# Patient Record
Sex: FEMALE | Race: WHITE | Employment: OTHER | URBAN - METROPOLITAN AREA
[De-identification: names, ages, dates, MRNs, and addresses within clinical notes are randomized per-mention and may not be internally consistent; named-entity substitution may affect disease eponyms.]

---

## 2017-04-18 ENCOUNTER — HOSPITAL ENCOUNTER (OUTPATIENT)
Dept: MAMMOGRAPHY | Age: 82
Discharge: HOME | End: 2017-04-18
Attending: INTERNAL MEDICINE
Payer: MEDICARE

## 2017-04-18 DIAGNOSIS — Z12.31 VISIT FOR SCREENING MAMMOGRAM: ICD-10-CM

## 2017-04-18 PROCEDURE — 77067 SCR MAMMO BI INCL CAD: CPT

## 2017-07-18 ENCOUNTER — HOSPITAL ENCOUNTER (OUTPATIENT)
Dept: GENERAL RADIOLOGY | Age: 82
Discharge: HOME | End: 2017-07-18
Payer: MEDICARE

## 2017-07-18 DIAGNOSIS — J98.4 RESTRICTIVE LUNG DISEASE: ICD-10-CM

## 2017-07-18 PROCEDURE — 71020 XR CHEST PA LAT: CPT

## 2017-08-12 PROBLEM — T88.7XXA NON-DOSE-RELATED ADVERSE REACTION TO MEDICATION: Status: ACTIVE | Noted: 2017-08-12
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
## 2017-08-12 PROBLEM — M25.50 ARTHRALGIA: Status: ACTIVE | Noted: 2017-08-12

## 2017-08-12 PROBLEM — R32 URINARY INCONTINENCE IN FEMALE: Status: ACTIVE | Noted: 2017-08-12

## 2017-08-12 PROBLEM — H61.22 IMPACTED CERUMEN, LEFT EAR: Status: ACTIVE | Noted: 2017-08-12

## 2017-08-12 PROBLEM — G89.29 BACK PAIN, CHRONIC: Status: ACTIVE | Noted: 2017-08-12

## 2017-08-12 PROBLEM — S20.02XA POSTTRAUMATIC HEMATOMA OF LEFT BREAST: Status: ACTIVE | Noted: 2017-08-12

## 2017-08-12 PROBLEM — H61.20 HEARING LOSS SECONDARY TO CERUMEN IMPACTION: Status: ACTIVE | Noted: 2017-08-12

## 2017-08-12 PROBLEM — M25.439 SWOLLEN WRIST: Status: ACTIVE | Noted: 2017-08-12

## 2017-08-12 PROBLEM — M54.9 BACK PAIN, CHRONIC: Status: ACTIVE | Noted: 2017-08-12

## 2017-08-12 PROBLEM — T07.XXXA MULTIPLE CONTUSIONS: Status: ACTIVE | Noted: 2017-08-12

## 2017-08-12 PROBLEM — R29.898 LEG WEAKNESS, BILATERAL: Status: ACTIVE | Noted: 2017-08-12
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
## 2017-08-12 PROBLEM — M79.10 MYALGIA: Status: ACTIVE | Noted: 2017-08-12

## 2017-08-12 PROBLEM — E78.5 HYPERLIPIDEMIA LDL GOAL <100: Status: ACTIVE | Noted: 2017-08-12

## 2017-08-12 PROBLEM — L92.0 GRANULOMA ANNULARE: Status: ACTIVE | Noted: 2017-08-12

## 2017-08-12 PROBLEM — Z79.899 LONG-TERM USE OF HIGH-RISK MEDICATION: Status: ACTIVE | Noted: 2017-08-12

## 2017-08-12 PROBLEM — M51.37 DDD (DEGENERATIVE DISC DISEASE), LUMBOSACRAL: Status: ACTIVE | Noted: 2017-08-12

## 2017-08-12 PROBLEM — R31.9 HEMATURIA: Status: ACTIVE | Noted: 2017-08-12

## 2017-08-12 PROBLEM — I10 HYPERTENSION, BENIGN: Status: ACTIVE | Noted: 2017-08-12

## 2017-08-12 PROBLEM — M06.9 RHEUMATOID ARTHRITIS (HCC): Status: ACTIVE | Noted: 2017-08-12

## 2017-08-12 RX ORDER — ACETYLCYSTEINE 600 MG
CAPSULE ORAL
COMMUNITY
End: 2017-09-08 | Stop reason: ALTCHOICE

## 2017-08-12 RX ORDER — BUDESONIDE AND FORMOTEROL FUMARATE DIHYDRATE 160; 4.5 UG/1; UG/1
2 AEROSOL RESPIRATORY (INHALATION) 2 TIMES DAILY
COMMUNITY
End: 2017-11-25 | Stop reason: SDUPTHER

## 2017-08-27 PROBLEM — K21.9 GASTROESOPHAGEAL REFLUX DISEASE WITHOUT ESOPHAGITIS: Status: ACTIVE | Noted: 2017-08-27

## 2017-08-27 PROBLEM — E03.9 ACQUIRED HYPOTHYROIDISM: Status: ACTIVE | Noted: 2017-08-27

## 2017-08-27 PROBLEM — E78.2 MIXED HYPERLIPIDEMIA: Status: ACTIVE | Noted: 2017-08-27

## 2017-08-28 ENCOUNTER — OFFICE VISIT (OUTPATIENT)
Dept: INTERNAL MEDICINE CLINIC | Age: 82
End: 2017-08-28

## 2017-08-28 VITALS
HEIGHT: 62 IN | OXYGEN SATURATION: 82 % | TEMPERATURE: 97.5 F | SYSTOLIC BLOOD PRESSURE: 132 MMHG | HEART RATE: 88 BPM | RESPIRATION RATE: 24 BRPM | WEIGHT: 130.4 LBS | BODY MASS INDEX: 24 KG/M2 | DIASTOLIC BLOOD PRESSURE: 88 MMHG

## 2017-08-28 DIAGNOSIS — M06.9 RHEUMATOID ARTHRITIS INVOLVING MULTIPLE SITES, UNSPECIFIED RHEUMATOID FACTOR PRESENCE: ICD-10-CM

## 2017-08-28 DIAGNOSIS — Z12.11 COLON CANCER SCREENING: ICD-10-CM

## 2017-08-28 DIAGNOSIS — E78.2 MIXED HYPERLIPIDEMIA: ICD-10-CM

## 2017-08-28 DIAGNOSIS — N39.0 URINARY TRACT INFECTION, SITE NOT SPECIFIED: ICD-10-CM

## 2017-08-28 DIAGNOSIS — E03.9 ACQUIRED HYPOTHYROIDISM: ICD-10-CM

## 2017-08-28 DIAGNOSIS — K21.9 GASTROESOPHAGEAL REFLUX DISEASE WITHOUT ESOPHAGITIS: ICD-10-CM

## 2017-08-28 DIAGNOSIS — I10 HYPERTENSION, BENIGN: Primary | ICD-10-CM

## 2017-08-28 DIAGNOSIS — Z00.00 MEDICARE ANNUAL WELLNESS VISIT, INITIAL: ICD-10-CM

## 2017-08-28 DIAGNOSIS — M51.37 DDD (DEGENERATIVE DISC DISEASE), LUMBOSACRAL: ICD-10-CM

## 2017-08-28 PROBLEM — H00.014 HORDEOLUM EXTERNUM OF LEFT UPPER EYELID: Status: ACTIVE | Noted: 2017-08-28

## 2017-08-28 LAB
ALBUMIN SERPL-MCNC: 4.4 G/DL (ref 3.9–5.4)
ALKALINE PHOS POC: 148 U/L (ref 38–126)
ALT SERPL-CCNC: 86 U/L (ref 9–52)
AST SERPL-CCNC: 44 U/L (ref 14–36)
BACTERIA UA POCT, BACTPOCT: NORMAL
BILIRUB UR QL STRIP: NEGATIVE
BUN BLD-MCNC: 23 MG/DL (ref 7–17)
CALCIUM BLD-MCNC: 10 MG/DL (ref 8.4–10.2)
CASTS UA POCT: 0
CHLORIDE BLD-SCNC: 99 MMOL/L (ref 98–107)
CHOLEST SERPL-MCNC: 190 MG/DL (ref 0–200)
CK (CPK) POC: 36 U/L (ref 30–135)
CLUE CELLS, CLUEPOCT: NEGATIVE
CO2 POC: 26 MMOL/L (ref 22–32)
CREAT BLD-MCNC: 0.5 MG/DL (ref 0.7–1.2)
CRYSTALS UA POCT, CRYSPOCT: NEGATIVE
EGFR (POC): 88.9
EPITHELIAL CELLS POCT, EPITHPOCT: NORMAL
GLUCOSE POC: 119 MG/DL (ref 65–105)
GLUCOSE UR-MCNC: NEGATIVE MG/DL
GRAN# POC: 9.9 K/UL (ref 2–7.8)
GRAN% POC: 88.3 % (ref 37–92)
HCT VFR BLD CALC: 48.5 % (ref 37–51)
HDLC SERPL-MCNC: 90 MG/DL (ref 35–130)
HGB BLD-MCNC: 16.1 G/DL (ref 12–18)
KETONES P FAST UR STRIP-MCNC: NEGATIVE MG/DL
LDL CHOLESTEROL POC: 66.2 MG/DL (ref 0–130)
LY# POC: 0.8 K/UL (ref 0.6–4.1)
LY% POC: 8 % (ref 10–58.5)
MCH RBC QN: 33.8 PG (ref 26–32)
MCHC RBC-ENTMCNC: 33.2 G/DL (ref 30–36)
MCV RBC: 102 FL (ref 80–97)
MID #, POC: 0.4 K/UL (ref 0–1.8)
MID% POC: 3.7 % (ref 0.1–24)
MUCUS UA POCT, MUCPOCT: NORMAL
PH UR STRIP: 7 [PH] (ref 5–7)
PLATELET # BLD: 251 K/UL (ref 140–440)
POTASSIUM SERPL-SCNC: 4.1 MMOL/L (ref 3.6–5)
PROT SERPL-MCNC: 7.4 G/DL (ref 6.3–8.2)
PROTEIN,URINE POC: NEGATIVE MG/DL
RBC # BLD: 4.77 M/UL (ref 4.2–6.3)
RBC UA POCT, RBCPOCT: NORMAL
SODIUM SERPL-SCNC: 143 MMOL/L (ref 137–145)
SP GR UR STRIP: 1.01 (ref 1.01–1.02)
T4 FREE SERPL-MCNC: 1.25 NG/DL (ref 0.71–1.85)
TCHOL/HDL RATIO (POC): 2.1 (ref 0–4)
TOTAL BILIRUBIN POC: 0.9 MG/DL (ref 0.2–1.3)
TRICH UA POCT, TRICHPOC: NEGATIVE
TRIGL SERPL-MCNC: 169 MG/DL (ref 0–200)
TSH BLD-ACNC: 4.68 UIU/ML (ref 0.4–4.2)
UA UROBILINOGEN AMB POC: NORMAL (ref 0.2–1)
URINALYSIS CLARITY POC: CLEAR
URINALYSIS COLOR POC: NORMAL
URINE BLOOD POC: NEGATIVE
URINE LEUKOCYTES POC: NORMAL
URINE NITRITES POC: NEGATIVE
VLDLC SERPL CALC-MCNC: 33.8 MG/DL
WBC # BLD: 11.1 K/UL (ref 4.1–10.9)
WBC UA POCT, WBCPOCT: NORMAL
YEAST UA POCT, YEASTPOC: NEGATIVE

## 2017-08-28 RX ORDER — CEPHALEXIN 500 MG/1
500 CAPSULE ORAL 2 TIMES DAILY
Qty: 20 CAP | Refills: 0 | Status: SHIPPED | OUTPATIENT
Start: 2017-08-28 | End: 2017-09-08 | Stop reason: ALTCHOICE

## 2017-08-28 RX ORDER — PREDNISONE 10 MG/1
1 TABLET ORAL DAILY
Refills: 0 | COMMUNITY
Start: 2017-08-03 | End: 2017-09-01 | Stop reason: ALTCHOICE

## 2017-08-28 NOTE — MR AVS SNAPSHOT
Visit Information Date & Time Provider Department Dept. Phone Encounter #  
 8/28/2017 10:20 AM Calvin Auguste MD [address] [phone] [id number] Follow-up Instructions Return in about 6 months (around 2/28/2018). Follow-up and Disposition History Your Appointments 2/27/2018 10:10 AM  
FOLLOW UP 10 with MD LEAH Alvarenga [facility] ([facility]) Appt Note: 6 MO FLP; [address] [id number] [address] [address] Upcoming Health Maintenance Date Due DTaP/Tdap/Td series (1 - Tdap) 3/10/1954 ZOSTER VACCINE AGE 60> 1/10/1993 GLAUCOMA SCREENING Q2Y 3/10/1998 OSTEOPOROSIS SCREENING (DEXA) 3/10/1998 Pneumococcal 65+ Low/Medium Risk (2 of 2 - PCV13) 12/4/2016 INFLUENZA AGE 9 TO ADULT 8/1/2017 MEDICARE YEARLY EXAM 8/29/2018 Allergies as of 8/28/2017  Review Complete On: 8/28/2017 By: Calvin Auguset MD  
  
 Severity Noted Reaction Type Reactions Penicillins  10/01/2014    Swelling Shellfish Derived  10/01/2014    Swelling Tramadol  08/12/2017    Unknown (comments) Current Immunizations  Never Reviewed Name Date Influenza Vaccine 10/1/2015, 9/1/2014 Pneumococcal Conjugate (PCV-13) 12/4/2015 Pneumococcal Polysaccharide (PPSV-23) 12/4/2015 Not reviewed this visit You Were Diagnosed With   
  
 Codes Comments Hypertension, benign    -  Primary ICD-10-CM: I10 
ICD-9-CM: 401.1 Mixed hyperlipidemia     ICD-10-CM: E78.2 ICD-9-CM: 272.2 Gastroesophageal reflux disease without esophagitis     ICD-10-CM: K21.9 ICD-9-CM: 530.81 Acquired hypothyroidism     ICD-10-CM: E03.9 ICD-9-CM: 355. 9 Rheumatoid arthritis involving multiple sites, unspecified rheumatoid factor presence ([facility]     ICD-10-CM: M06.9 ICD-9-CM: 714.0 DDD (degenerative disc disease), lumbosacral     ICD-10-CM: M51.37 
ICD-9-CM: 722.52 Medicare annual wellness visit, initial     ICD-10-CM: Z00.00 ICD-9-CM: V70.0 Colon cancer screening     ICD-10-CM: Z12.11 ICD-9-CM: V76.51 Vitals BP Pulse Temp Resp Height(growth percentile) Weight(growth percentile) 132/88 (BP 1 Location: Left arm, BP Patient Position: Sitting) 88 97.5 °F (36.4 °C) (Oral) 24 5' 1.5\" (1.562 m) 130 lb 6.4 oz (59.1 kg) SpO2 BMI OB Status Smoking Status (!) 82% 24.24 kg/m2 Postmenopausal Former Smoker BMI and BSA Data Body Mass Index Body Surface Area  
 24.24 kg/m 2 1.6 m 2 Your Updated Medication List  
  
   
This list is accurate as of: 8/28/17 12:00 PM.  Always use your most recent med list.  
  
  
  
  
 aspirin delayed-release 81 mg tablet Take  by mouth daily. CENTRUM SILVER ULTRA WOMEN'S PO Take  by mouth daily. cephALEXin 500 mg capsule Commonly known as:  Prudence Leavens Take 1 Cap by mouth two (2) times a day. CLARITIN 10 mg tablet Generic drug:  loratadine Take 10 mg by mouth daily. DETROL LA 4 mg ER capsule Generic drug:  tolterodine ER Take 4 mg by mouth daily. Indications: URINARY URGE INCONTINENCE  
  
 hydroCHLOROthiazide 25 mg tablet Commonly known as:  HYDRODIURIL Take 25 mg by mouth daily. latanoprost 0.005 % ophthalmic solution Commonly known as:  Torsten Caras Administer 1 drop to both eyes nightly. levothyroxine 100 mcg tablet Commonly known as:  SYNTHROID Take  by mouth Daily (before breakfast). Indications: HYPOTHYROIDISM  
  
  mg Cap capsule Generic drug:  acetylcysteine Take  by mouth. omeprazole 20 mg capsule Commonly known as:  PRILOSEC Take 20 mg by mouth daily. Indications: GASTROESOPHAGEAL REFLUX  
  
 pravastatin 40 mg tablet Commonly known as:  PRAVACHOL Take 40 mg by mouth nightly. Indications: HYPERCHOLESTEROLEMIA predniSONE 10 mg tablet Commonly known as:  Juan Burton Take 1 Tab by mouth daily. rx from Dr. Johnny Ndiaye. SYMBICORT 160-4.5 mcg/actuation HFA inhaler Generic drug:  budesonide-formoterol Take 2 Puffs by inhalation two (2) times a day. TYLENOL ARTHRITIS PAIN 650 mg CR tablet Generic drug:  acetaminophen Take 1,300 mg by mouth two (2) times a day. Prescriptions Printed Refills  
 cephALEXin (KEFLEX) 500 mg capsule 0 Sig: Take 1 Cap by mouth two (2) times a day. Class: Print Route: Oral  
  
We Performed the Following AMB POC CK (CPK) [55523 CPT(R)] AMB POC COMPLETE CBC,AUTOMATED ENTER G7033713 CPT(R)] AMB POC COMPREHENSIVE METABOLIC PANEL [75712 CPT(R)] AMB POC FECAL BLOOD, OCCULT, QL 3 CARDS [85988 CPT(R)] AMB POC LIPID PROFILE [55816 CPT(R)] AMB POC T4, FREE [id number] CPT(R)] AMB POC TSH [39128 CPT(R)] AMB POC URINALYSIS DIP STICK AUTO W/ MICRO  [61338 CPT(R)] Follow-up Instructions Return in about 6 months (around 2/28/2018). Introducing [facility]! Linda Bueno introduces [employer] patient portal. Now you can access parts of your medical record, email your doctor's office, and request medication refills online. 1. In your internet browser, go to [email or web address] 2. Click on the First Time User? Click Here link in the Sign In box. You will see the New Member Sign Up page. 3. Enter your [employer] Access Code exactly as it appears below. You will not need to use this code after youve completed the sign-up process. If you do not sign up before the expiration date, you must request a new code. · [employer] Access Code: [email or web address] Expires: 10/16/2017 10:05 AM 
 
4. Enter the last four digits of your Social Security Number (xxxx) and Date of Birth (mm/dd/yyyy) as indicated and click Submit. You will be taken to the next sign-up page. 5. Create a [employer] ID. This will be your [employer] login ID and cannot be changed, so think of one that is secure and easy to remember. 6. Create a [employer] password. You can change your password at any time. 7. Enter your Password Reset Question and Answer. This can be used at a later time if you forget your password. 8. Enter your e-mail address. You will receive e-mail notification when new information is available in [address] 9. Click Sign Up. You can now view and download portions of your medical record. 10. Click the Download Summary menu link to download a portable copy of your medical information. If you have questions, please visit the Frequently Asked Questions section of the [employer] website. Remember, [employer] is NOT to be used for urgent needs. For medical emergencies, dial 911. Now available from your iPhone and Android! Please provide this summary of care documentation to your next provider. Your primary care clinician is listed as [place]. If you have any questions after today's visit, please call [phone].

## 2017-08-28 NOTE — PROGRESS NOTES
This is an Initial Medicare Annual Wellness Exam (AWV) (Performed 12 months after IPPE or effective date of Medicare Part B enrollment, Once in a lifetime)    I have reviewed the patient's medical history in detail and updated the computerized patient record. She presents today for initial annual Medicare wellness examination. She is also in follow-up of her other multiple medical problems to include hypertension, hyperlipidemia, GERD, degenerative disc disease with DJD, rheumatoid arthritis, and hypothyroidism. She is now getting around with a rolling walker or a wheelchair. She therefore does not get a whole lot of exercise. She is taking her medications and trying to follow her diet. She does have irritation to the left upper eyelid is been there for about 2 weeks. She denies any other problems. There are no cardiovascular comments. There are no GI/ complaints. She has a chronic arthritic complaints with no other new complaints. There are no other complaints on complete review of systems.     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    Rt lobectomy, treatment, yaerly cxr neg    History of urinary incontinence     on Detrol    Hyperlipidemia LDL goal <100 8/12/2017    Hypertension, benign 8/12/2017    Hypothyroid     Ill-defined condition     Lobectomy 1958 due to TB; has scarring, followed by Dr Perdomo Session Impacted cerumen, left ear 8/12/2017    Leg weakness, bilateral 8/12/2017    Long-term use of high-risk medication 8/12/2017    Multiple contusions 8/12/2017    Myalgia 8/12/2017    Non-dose-related adverse reaction to medication 8/12/2017    Posttraumatic hematoma of left breast 8/12/2017    Rheumatoid arthritis ([facility] 8/12/2017    Swollen wrist, left 8/12/2017    Urinary incontinence in female 8/12/2017      Past Surgical History:   Procedure Laterality Date    HX BREAST BIOPSY Right     benign core    HX CATARACT REMOVAL Bilateral 2009    HX LOBECTOMY Right 1958    due to TB     Current Outpatient Prescriptions   Medication Sig Dispense Refill    predniSONE (DELTASONE) 10 mg tablet Take 1 Tab by mouth daily. rx from Dr. Francina Dakins, Pulm.  0    cephALEXin (KEFLEX) 500 mg capsule Take 1 Cap by mouth two (2) times a day. 20 Cap 0    acetylcysteine (NAC) 600 mg cap capsule Take  by mouth.  budesonide-formoterol (SYMBICORT) 160-4.5 mcg/actuation HFA inhaler Take 2 Puffs by inhalation two (2) times a day.  latanoprost (XALATAN) 0.005 % ophthalmic solution Administer 1 drop to both eyes nightly.  omeprazole (PRILOSEC) 20 mg capsule Take 20 mg by mouth daily. Indications: GASTROESOPHAGEAL REFLUX      levothyroxine (SYNTHROID) 100 mcg tablet Take  by mouth Daily (before breakfast). Indications: HYPOTHYROIDISM      hydrochlorothiazide (HYDRODIURIL) 25 mg tablet Take 25 mg by mouth daily.  pravastatin (PRAVACHOL) 40 mg tablet Take 40 mg by mouth nightly. Indications: HYPERCHOLESTEROLEMIA      tolterodine ER (DETROL LA) 4 mg ER capsule Take 4 mg by mouth daily. Indications: URINARY URGE INCONTINENCE      loratadine (CLARITIN) 10 mg tablet Take 10 mg by mouth daily.  acetaminophen (TYLENOL ARTHRITIS PAIN) 650 mg CR tablet Take 1,300 mg by mouth two (2) times a day.  aspirin delayed-release 81 mg tablet Take  by mouth daily.  MULTIVITS W-FE,OTHER MIN/LUT (CENTRUM SILVER ULTRA WOMEN'S PO) Take  by mouth daily.        Allergies   Allergen Reactions    Penicillins Swelling    Shellfish Derived Swelling    Tramadol Unknown (comments)     Family History   Problem Relation Age of Onset    No Known Problems Mother     Stroke Father      Social History   Substance Use Topics    Smoking status: Former Smoker    Smokeless tobacco: Never Used    Alcohol use Yes      Comment: 1 martini with dinner every night     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    Hematuria R31.9    Hypertension, benign I10    Leg weakness, bilateral R29.898    Long-term use of high-risk medication Z79.899    Myalgia M79.1    Rheumatoid arthritis ([facility] M06.9    Urinary incontinence in female R32    Mixed hyperlipidemia E78.2    Acquired hypothyroidism E03.9    Gastroesophageal reflux disease without esophagitis K21.9    Medicare annual wellness visit, initial Z00.00    Colon cancer screening Z12.11    Hordeolum externum of left upper eyelid H00.014       Depression Risk Factor Screening:     PHQ over the last two weeks 8/28/2017   Little interest or pleasure in doing things Not at all   Feeling down, depressed or hopeless Not at all   Total Score PHQ 2 0     Alcohol Risk Factor Screening: You do not drink alcohol or very rarely. Functional Ability and Level of Safety:     Hearing Loss  Hearing is good. Activities of Daily Living  The home contains: no safety equipment  Patient does total self care    Fall Risk  Fall Risk Assessment, last 12 mths 8/28/2017   Able to walk? Yes   Fall in past 12 months? Yes   Fall with injury? No   Number of falls in past 12 months 1   Fall Risk Score 1       Abuse Screen  Patient is not abused    Cognitive Screening   Evaluation of Cognitive Function:  Has your family/caregiver stated any concerns about your memory: no  Normal     ROS:    Constitutional: She denies fevers, weight loss, sweats. Eyes: No blurred or double vision.   Swollen area left upper eyelid for about 2 weeks  ENT: No difficulty with swallowing, taste, speech or smell. NECK: no stiffness swelling or lymph node enlargement  Respiratory: No cough wheezing or shortness of breath. Cardiovascular: Denies chest pain, palpitations, unexplained indigestion or syncope. Breast: She has noted no masses or lumps and no discharge or axillary swelling  Gastrointestinal:  No changes in bowel movements, no abdominal pain, no bloating. Genitourinary: No discharge or abnormal bleeding or pain  Extremities: As noted above chronic joint pain, with some stiffness without swelling  Neurological:  No numbness, tingling, burring paresthesias or loss of motor strength. No syncope, dizziness or frequent headache  Skin:  No recent rashes or mole changes. Psychiatric/Behavioral:  Negative for depression. The patient is not nervous/anxious. HEMATOLOGIC: no easy bruising or bleeding gums  Endocrine: no sweats of urinary frequency or excessive thirst    Vitals:    08/28/17 1043   BP: 132/88   Pulse: 88   Resp: 24   Temp: 97.5 °F (36.4 °C)   TempSrc: Oral   SpO2: (!) 82%   Weight: 130 lb 6.4 oz (59.1 kg)   Height: 5' 1.5\" (1.562 m)   PainSc:   0 - No pain        PHYSICAL EXAM:    General appearance - alert, well appearing, and in no distress  Mental status - alert, oriented to person, place, and time  HEENT:  Ears - bilateral TM's and external ear canals clear  Eyes - pupillary responses were normal.  Extraocular muscle function intact. Lids and conjunctiva not injected. Fundoscopic exam revealed sharp disc margins. eye movements intact, mid left upper eyelid slightly swollen tender area consistent with a possible stye. We will treat this if it does not resolve then she certainly needs to be seen by an ophthalmologist.  Pharynx- clear with teeth in good repair. No masses were noted  Neck - supple without thyromegaly or burit. No JVD noted  Lungs - clear to auscultation and percussion  Cardiac- normal rate, regular rhythm without murmurs. PMI not displaced.   No gallop, rub or click  Breast: deferred to GYN  Abdomen - flat, soft, non-tender without palpable organomegaly or mass. No pulsatile mass was felt, and not bruit was heard. Bowel sounds were active   Female - deferred to GYN  Rectal - deferred to GYN  Extremities -  no clubbing cyanosis or edema  Lymphatics - no palpable lymphadenopathy, no hepatosplenomegaly  Peripheral vascular - Dorsalis pedis and posterior tibial pulses felt without difficulty  Skin - no rash or unusual mole change noted  Neurological - Cranial nerves II-XII grossly intact. Motor strength 5/5. DTR's 2+ and symmetric. Station and gait normal with the use of a walker  Back exam - full range of motion, no tenderness, palpable spasm or pain on motion  Musculoskeletal - no joint tenderness, multiple chronic arthritic deformities of both hands. No swelling  Hematologic: no purpura, petechiae or bruising    Patient Care Team   Patient Care Team:  Popeye Polanco MD as PCP - General (Internal Medicine)    Advice/Referrals/Counseling   Education and counseling provided:  Are appropriate based on today's review and evaluation  Colorectal cancer screening tests    Assessment/Plan     ASSESSMENT:   1. Hypertension, benign    2. Mixed hyperlipidemia    3. Gastroesophageal reflux disease without esophagitis    4. Acquired hypothyroidism    5. Rheumatoid arthritis involving multiple sites, unspecified rheumatoid factor presence ([facility]    6. DDD (degenerative disc disease), lumbosacral    7. Medicare annual wellness visit, initial    8. Colon cancer screening      Impression  1. Hypertension that is controlled currently on hydrochlorothiazide no changes to be made today  2. Hyperlipidemia that status is pending we will make adjustments if necessary  3. GERD seems to be stable on treatment  4. Hypothyroidism status pending  5. Rheumatoid arthritis with joint deformities as noted. Never on any disease remitting medicines per her oral   6.   Degenerative disc Z seems to be stable does get around with a walker  7. Apparently has some interstitial or with scarring in her lungs and is followed by pulmonary note her O2 sat was initially low but repeat sat was 91% on room air  Medicare annual wellness examination questionnaire performed on patient today. Results were reviewed with her and her . Their questions were answered. Lifestyle recommendations modifications discussed and made. Laboratory studies pending as noted and will make further recommendations based upon today's lab. Follow stable continue same and recheck again in 6 months or sooner should to be a problem she does have the stye on the left upper eyelid and will treat that with Keflex for 10 days if not resolved and I will need to be evaluated by an ophthalmologist.    PLAN:  .  Orders Placed This Encounter    AMB POC FECAL OCCULT BLOOD QL-3 CARDS    AMB POC CK (CPK)    AMB POC COMPLETE CBC,AUTOMATED ENTER    AMB POC COMPREHENSIVE METABOLIC PANEL    AMB POC LIPID PROFILE    AMB POC T4, FREE    AMB POC TSH    AMB POC URINALYSIS DIP STICK AUTO W/ MICRO     predniSONE (DELTASONE) 10 mg tablet    cephALEXin (KEFLEX) 500 mg capsule         ATTENTION:   This medical record was transcribed using an electronic medical records system. Although proofread, it may and can contain electronic and spelling errors. Other human spelling and other errors may be present. Corrections may be executed at a later time. Please feel free to contact us for any clarifications as needed. Follow-up Disposition:  Return in about 6 months (around 2/28/2018).       Dustin Jama MD  Health Maintenance Due   Topic Date Due    DTaP/Tdap/Td series (1 - Tdap) [birth year]10/1954    ZOSTER VACCINE AGE 60>  01/10/1993    GLAUCOMA SCREENING Q2Y  03/10/1998    OSTEOPOROSIS SCREENING (DEXA)  03/10/1998    Pneumococcal 65+ Low/Medium Risk (2 of 2 - PCV13) 12/04/2016    INFLUENZA AGE 9 TO ADULT  08/01/2017

## 2017-08-28 NOTE — PROGRESS NOTES
Transfer of care from Dr. Rachel Kang,    1. Have you been to the ER, urgent care clinic since your last visit? Hospitalized since your last visit? No    2. Have you seen or consulted any other health care providers outside of the [address] [place] [address] since your last visit? Include any pap smears or colon screening. Saw Dr. Malachi Gibson , Pulmonary. Follows her lung issues. Has a Living Will. \ also, has Advanced Medical Directive. Nutrition: Little appetite, does eat and uses Boost supplement. Fall score of 1. No injuries with fall, tripped approx. 6 months ago.

## 2017-08-29 NOTE — PROGRESS NOTES
Labs are okay except for mild elevation of her liver enzymes. I do not have her prior liver enzymes to compare this and see if it is stable.   At this stable no treatment change needed if this is higher than we need to repeat her liver enzymes in a month

## 2017-09-01 ENCOUNTER — OFFICE VISIT (OUTPATIENT)
Dept: INTERNAL MEDICINE CLINIC | Age: 82
End: 2017-09-01

## 2017-09-01 VITALS
BODY MASS INDEX: 23.92 KG/M2 | SYSTOLIC BLOOD PRESSURE: 126 MMHG | OXYGEN SATURATION: 84 % | HEART RATE: 100 BPM | DIASTOLIC BLOOD PRESSURE: 98 MMHG | HEIGHT: 62 IN | WEIGHT: 130 LBS | RESPIRATION RATE: 40 BRPM

## 2017-09-01 DIAGNOSIS — R09.02 HYPOXIA: ICD-10-CM

## 2017-09-01 DIAGNOSIS — J84.9 INTERSTITIAL LUNG DISEASE (HCC): ICD-10-CM

## 2017-09-01 DIAGNOSIS — I10 ESSENTIAL HYPERTENSION: ICD-10-CM

## 2017-09-01 DIAGNOSIS — R41.0 CONFUSION: Primary | ICD-10-CM

## 2017-09-01 RX ORDER — PREDNISONE 20 MG/1
40 TABLET ORAL
Qty: 60 TAB | Refills: 1 | Status: SHIPPED | OUTPATIENT
Start: 2017-09-01 | End: 2017-09-27 | Stop reason: SDUPTHER

## 2017-09-01 NOTE — PROGRESS NOTES
Chief Complaint   Patient presents with    Altered mental status     this morning. SUBJECTIVE:    Shorty Martinez is a 80 y.o. female is brought in by her  today secondary to marked confusion noted this morning and apparently. She got out of bed before  at this morning was walking around the house in the living room looking for to press thinking she was in the bathroom. Her  reorient her and she seems to be doing okay as far as her memory since that time. At that time there was no focal weakness visual symptoms or speech impediment. She does have known interstitial lung disease followed by pulmonary. She was told before that she would not benefit from oxygen. According to her  she was previously on prednisone 40 mg a day and that seemed to help quite a bit but now she has been cut to 10 mg a day her oxygenation seems to be more difficult and she is breathing harder. She does have some cough but has no colored sputum. She denies any fevers or chills. She denies chest pain chest tightness or other complaints. Here in the office oxygen was noted to be 84% sat after she initially walked into the room from the exam room at rest the oxygen improved to 88%. She was then walked the length of the mendiola here at the office oxygen fell to 75% with exercise then oxygen was placed on her and O2 sat improved to 93% with oxygen. Current Outpatient Prescriptions   Medication Sig Dispense Refill    predniSONE (DELTASONE) 20 mg tablet Take 2 Tabs by mouth daily (with breakfast). 60 Tab 1    cephALEXin (KEFLEX) 500 mg capsule Take 1 Cap by mouth two (2) times a day. 20 Cap 0    acetylcysteine (NAC) 600 mg cap capsule Take  by mouth.  budesonide-formoterol (SYMBICORT) 160-4.5 mcg/actuation HFA inhaler Take 2 Puffs by inhalation two (2) times a day.  latanoprost (XALATAN) 0.005 % ophthalmic solution Administer 1 drop to both eyes nightly.       omeprazole (PRILOSEC) 20 mg capsule Take 20 mg by mouth daily. Indications: GASTROESOPHAGEAL REFLUX      levothyroxine (SYNTHROID) 100 mcg tablet Take  by mouth Daily (before breakfast). Indications: HYPOTHYROIDISM      hydrochlorothiazide (HYDRODIURIL) 25 mg tablet Take 25 mg by mouth daily.  pravastatin (PRAVACHOL) 40 mg tablet Take 40 mg by mouth nightly. Indications: HYPERCHOLESTEROLEMIA      tolterodine ER (DETROL LA) 4 mg ER capsule Take 4 mg by mouth daily. Indications: URINARY URGE INCONTINENCE      loratadine (CLARITIN) 10 mg tablet Take 10 mg by mouth daily.  acetaminophen (TYLENOL ARTHRITIS PAIN) 650 mg CR tablet Take 1,300 mg by mouth two (2) times a day.  aspirin delayed-release 81 mg tablet Take  by mouth daily.  MULTIVITS W-FE,OTHER MIN/LUT (CENTRUM SILVER ULTRA WOMEN'S PO) Take  by mouth daily.        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    Rt lobectomy, treatment, yaerly cxr neg    History of urinary incontinence     on Detrol    Hyperlipidemia LDL goal <100 8/12/2017    Hypertension, benign 8/12/2017    Hypothyroid     Ill-defined condition     Lobectomy 1958 due to TB; has scarring, followed by Dr Gabe Barrios Impacted cerumen, left ear 8/12/2017    Leg weakness, bilateral 8/12/2017    Long-term use of high-risk medication 8/12/2017    Multiple contusions 8/12/2017    Myalgia 8/12/2017    Non-dose-related adverse reaction to medication 8/12/2017    Posttraumatic hematoma of left breast 8/12/2017    Rheumatoid arthritis ([facility] [address] 8/12/2017    Swollen wrist, left 8/12/2017    Urinary incontinence in female 8/12/2017     Past Surgical History:   Procedure Laterality Date    HX BREAST BIOPSY Right     benign core    HX CATARACT REMOVAL Bilateral 2009    HX LOBECTOMY Right 1958    due to TB     Allergies   Allergen Reactions    Penicillins Swelling    Shellfish Derived Swelling    Tramadol Unknown (comments)       REVIEW OF SYSTEMS:  General: negative for - chills or fever, or weight loss or gain  ENT: negative for - headaches, nasal congestion or tinnitus  Eyes: no blurred or visual changes  Neck: No stiffness or swollen nodes  Respiratory: negative for -  hemoptysis, or wheezing. Shortness of breath as noted above with some cough but no colored sputum that appears to be thick and clear  Cardiovascular : negative for - chest pain, edema, palpitations or shortness of breath  Gastrointestinal: negative for - abdominal pain, blood in stools, heartburn or nausea/vomiting  Genito-Urinary: no dysuria, trouble voiding, or hematuria  Musculoskeletal: negative for - gait disturbance, joint pain, joint stiffness or joint swelling  Neurological: no TIA or stroke symptoms.   Confusion is described above  Hematologic: no bruises, no bleeding  Lymphatic: no swollen glands  Integument: no lumps, mole changes, nail changes or rash  Endocrine:no malaise/lethargy polyuria or polydipsia or unexpected weight changes        Social History     Social History    Marital status:      Spouse name: N/A    Number of children: N/A    Years of education: N/A     Social History Main Topics    Smoking status: Former Smoker    Smokeless tobacco: Never Used    Alcohol use Yes      Comment: 1 martini with dinner every night    Drug use: No    Sexual activity: Not Asked     Other Topics Concern    None     Social History Narrative     Family History   Problem Relation Age of Onset    No Known Problems Mother     Stroke Father        OBJECTIVE:     Visit Vitals    BP (!) 126/98 (BP 1 Location: Left arm, BP Patient Position: Sitting)    Pulse 100    Resp (!) 40    Ht 5' 1.5\" (1.562 m)    Wt 130 lb (59 kg)    SpO2 (!) 84%    BMI 24.17 kg/m2     CONSTITUTIONAL:   well nourished, appears age appropriate  EYES: sclera anicteric, PERRL, EOMI  ENMT:nars clear, moist mucous membranes, pharynx clear  NECK: supple. Thyroid normal, No JVD or bruits  RESPIRATORY: Chest: Decreased breath sounds bilateral scattered rhonchi bilateral dry bibasilar rales prolonged expiratory phase  CARDIOVASCULAR: Heart: regular rate and rhythm no murmurs, rubs or gallops, PMI not displaced, No thrills  GASTROINTESTINAL: Abdomen: non distended, soft, non tender, bowel sounds normal  HEMATOLOGIC: no purpura, petechiae or bruising  LYMPHATIC: No lymph node enlargemant  MUSCULOSKELETAL: Extremities: no edema or active synovitis, pulse 1+   INTEGUMENT: No unusual rashes or suspicious skin lesions noted. Nails appear normal.  PERIPHERAL VASCULAR: normal pulses femoral, PT and DP  NEUROLOGIC: non-focal exam, A & O X 3  PSYCHIATRIC:, appropriate affect     ASSESSMENT:   1. Confusion    2. Hypoxia    3. Essential hypertension    4. Interstitial lung disease (HCC)      Impression  1. Confusion I think was related to hypoxemia  2. Hypoxemia clearly documented here in office  3. Chest x-ray revealed interstitial lung disease that appears to be severe  4. Hypertension not controlled probably related to anxiety and hypoxemia  She clearly is a candidate for home oxygen and does have improvement documented here in the office with oxygen. The plan is to set her up oxygen 2 L/min and adjust as needed we will also increase her prednisone from 10 mg a day to 40 mg a day which has been seen to help before and I recheck her back myself in a week or sooner should they be a problem. I did discuss hospitalization which I recommended and the patient and her  do not want this.   45 minutes spent on this office visit today with high complexity decision making    PLAN:  .  Orders Placed This Encounter    XR CHEST PA LAT    predniSONE (DELTASONE) 20 mg tablet         ATTENTION:   This medical record was transcribed using an electronic medical records system. Although proofread, it may and can contain electronic and spelling errors. Other human spelling and other errors may be present. Corrections may be executed at a later time. Please feel free to contact us for any clarifications as needed. Follow-up Disposition:  Return in about 1 week (around 9/8/2017). Results for orders placed or performed in visit on 09/01/17   XR CHEST PA LAT    Narrative    EXAM:  XR CHEST PA LAT    INDICATION:  Hypoxemia    COMPARISON:  7/18/2017     FINDINGS:    PA and lateral radiographs of the chest demonstrate a severe chronic  interstitial fibrosis. The appearance is unchanged when compared to the previous  study. No superimposed acute findings are demonstrated. There is unchanged mild  to moderate cardiomegaly and a calcified, perisplenic aorta. No pleural fluid is  demonstrated. There are degenerative changes in the spine and shoulders. Impression    IMPRESSION:     Interstitial fibrosis. No acute cardiopulmonary disease. Varsha Giraldo MD    The patient verbalized understanding of the problems and plans as explained.

## 2017-09-01 NOTE — PROGRESS NOTES
Labs are okay except for mild elevation of her liver enzymes. I do not have her prior liver enzymes to compare this and see if it is stable. At this stable no treatment change needed if this is higher than we need to repeat her liver enzymes in a month .   Dr. Cristiano Fung did review labs checked by previous

## 2017-09-01 NOTE — MR AVS SNAPSHOT
Visit Information Date & Time Provider Department Dept. Phone Encounter #  
 9/1/2017  2:10 PM Glenn Bolivar [facility] [phone] [id number] Follow-up Instructions Return in about 1 week (around 9/8/2017). Follow-up and Disposition History Your Appointments 9/8/2017  2:30 PM  
[facility] ([address]) Appt Note: 1 week fu; 1 week fu  
 [address] [id number] [address] [address]  
  
    
 2/27/2018 10:10 AM  
FOLLOW UP 10 with [facility] ([address]) Appt Note: 6 MO FLP; [address] [address] [phone] Upcoming Health Maintenance Date Due DTaP/Tdap/Td series (1 - Tdap) [birth year]10/1954 ZOSTER VACCINE AGE 60> 1/10/1993 GLAUCOMA SCREENING Q2Y 3/10/1998 OSTEOPOROSIS SCREENING (DEXA) 3/10/1998 Pneumococcal 65+ Low/Medium Risk (2 of 2 - PCV13) 12/4/2016 INFLUENZA AGE 9 TO ADULT 8/1/2017 MEDICARE YEARLY EXAM 8/29/2018 Allergies as of 9/1/2017  Review Complete On: 9/1/2017 By: Neli Viramontes MD  
  
 Severity Noted Reaction Type Reactions Penicillins  10/01/2014    Swelling Shellfish Derived  10/01/2014    Swelling Tramadol  08/12/2017    Unknown (comments) Current Immunizations  Never Reviewed Name Date Influenza Vaccine 10/1/2015, 9/1/2014 Pneumococcal Conjugate (PCV-13) 12/4/2015 Pneumococcal Polysaccharide (PPSV-23) 12/4/2015 Not reviewed this visit You Were Diagnosed With   
  
 Codes Comments Confusion    -  Primary ICD-10-CM: R41.0 ICD-9-CM: 298.9 Hypoxia     ICD-10-CM: R09.02 
ICD-9-CM: 799.02 Essential hypertension     ICD-10-CM: I10 
ICD-9-CM: 401.9 [facility]     ICD-10-CM: J84.9 ICD-9-CM: 704 Vitals BP Pulse Resp Height(growth percentile) Weight(growth percentile) SpO2  
 (!) 126/98 (BP 1 Location: Left arm, BP Patient Position: Sitting) 100 (!) 40 5' 1.5\" (1.562 m) 130 lb (59 kg) (!) 84% BMI OB Status Smoking Status 24.17 kg/m2 Postmenopausal Former Smoker BMI and BSA Data Body Mass Index Body Surface Area  
 24.17 kg/m 2 1.6 m 2 Preferred Pharmacy Pharmacy Name Phone [id number] [facility] [phone] Your Updated Medication List  
  
   
This list is accurate as of: 9/1/17  4:09 PM.  Always use your most recent med list.  
  
  
  
  
 aspirin delayed-release 81 mg tablet Take  by mouth daily. CENTRUM SILVER ULTRA WOMEN'S PO Take  by mouth daily. cephALEXin 500 mg capsule Commonly known as:  Moon Rayo Take 1 Cap by mouth two (2) times a day. CLARITIN 10 mg tablet Generic drug:  loratadine Take 10 mg by mouth daily. DETROL LA 4 mg ER capsule Generic drug:  tolterodine ER Take 4 mg by mouth daily. Indications: URINARY URGE INCONTINENCE  
  
 hydroCHLOROthiazide 25 mg tablet Commonly known as:  HYDRODIURIL Take 25 mg by mouth daily. latanoprost 0.005 % ophthalmic solution Commonly known as:  Jannet Shultz Administer 1 drop to both eyes nightly. levothyroxine 100 mcg tablet Commonly known as:  SYNTHROID Take  by mouth Daily (before breakfast). Indications: HYPOTHYROIDISM  
  
  mg Cap capsule Generic drug:  acetylcysteine Take  by mouth. omeprazole 20 mg capsule Commonly known as:  PRILOSEC Take 20 mg by mouth daily. Indications: GASTROESOPHAGEAL REFLUX  
  
 pravastatin 40 mg tablet Commonly known as:  PRAVACHOL Take 40 mg by mouth nightly. Indications: HYPERCHOLESTEROLEMIA  
  
 predniSONE 20 mg tablet Commonly known as:  Alcario Shires Take 2 Tabs by mouth daily (with breakfast). SYMBICORT 160-4.5 mcg/actuation HFA inhaler Generic drug:  budesonide-formoterol Take 2 Puffs by inhalation two (2) times a day. TYLENOL ARTHRITIS PAIN 650 mg CR tablet Generic drug:  acetaminophen Take 1,300 mg by mouth two (2) times a day. Prescriptions Sent to Pharmacy Refills  
 predniSONE (DELTASONE) 20 mg tablet 1 Sig: Take 2 Tabs by mouth daily (with breakfast). Class: Normal  
 Pharmacy: [place] [id number], [address] [place] [address] #: [phone] Route: Oral  
  
We Performed the Following LONG TERM OXYGEN THERAPY PRESCRIBED [4030F CPT(R)] REFERRAL TO RESPIRATORY THERAPY [REF96 Custom] Comments:  
 Needs 2 liters of continue oxygen. NPI# [id number] Follow-up Instructions Return in about 1 week (around 9/8/2017). To-Do List   
 09/01/2017 Imaging:  XR CHEST PA LAT Referral Information Referral ID Referred By Referred To  
  
 [id number] Michelle Cheng Not Available Visits Status Start Date End Date 1 New Request 9/1/17 9/1/18 If your referral has a status of pending review or denied, additional information will be sent to support the outcome of this decision. Introducing [facility]! Es Alvar[employer] introduces [employer] patient portal. Now you can access parts of your medical record, email your doctor's office, and request medication refills online. 1. In your internet browser, go to [email or web address] 2. Click on the First Time User? Click Here link in the Sign In box. You will see the New Member Sign Up page. 3. Enter your [employer] Access Code exactly as it appears below. You will not need to use this code after youve completed the sign-up process. If you do not sign up before the expiration date, you must request a new code. · [employer] Access Code: [email or web address] Expires: 10/16/2017 10:05 AM 
 
4. Enter the last four digits of your Social Security Number (xxxx) and Date of Birth (mm/dd/yyyy) as indicated and click Submit. You will be taken to the next sign-up page. 5. Create a [employer] ID. This will be your [employer] login ID and cannot be changed, so think of one that is secure and easy to remember. 6. Create a [employer] password. You can change your password at any time. 7. Enter your Password Reset Question and Answer. This can be used at a later time if you forget your password. 8. Enter your e-mail address. You will receive e-mail notification when new information is available in [address] 9. Click Sign Up. You can now view and download portions of your medical record. 10. Click the Download Summary menu link to download a portable copy of your medical information. If you have questions, please visit the Frequently Asked Questions section of the [employer] website. Remember, [employer] is NOT to be used for urgent needs. For medical emergencies, dial 911. Now available from your iPhone and Android! Please provide this summary of care documentation to your next provider. Your primary care clinician is listed as [place]. If you have any questions after today's visit, please call [phone].

## 2017-09-01 NOTE — PROGRESS NOTES
1. Have you been to the ER, urgent care clinic since your last visit? Hospitalized since your last visit? No    2. Have you seen or consulted any other health care providers outside of the [address] [place] [address] since your last visit? Include any pap smears or colon screening. No    Confusion this morning.

## 2017-09-06 DIAGNOSIS — I10 ESSENTIAL HYPERTENSION: Primary | ICD-10-CM

## 2017-09-06 RX ORDER — HYDROCHLOROTHIAZIDE 25 MG/1
TABLET ORAL
Qty: 90 TAB | Refills: 3 | OUTPATIENT
Start: 2017-09-06

## 2017-09-06 RX ORDER — HYDROCHLOROTHIAZIDE 25 MG/1
TABLET ORAL
Qty: 90 TAB | Refills: 3 | Status: SHIPPED | OUTPATIENT
Start: 2017-09-06 | End: 2017-09-07 | Stop reason: SDUPTHER

## 2017-09-06 NOTE — TELEPHONE ENCOUNTER
Requested Prescriptions     Pending Prescriptions Disp Refills    hydroCHLOROthiazide (HYDRODIURIL) 25 mg tablet [Pharmacy Med Name: HYDROCHLOROTHIAZIDE 25 MG TAB] 90 Tab 3     Sig: TAKE 1 TABLET BY MOUTH EVERY DAY

## 2017-09-07 RX ORDER — HYDROCHLOROTHIAZIDE 25 MG/1
TABLET ORAL
Qty: 90 TAB | Refills: 3 | Status: SHIPPED | OUTPATIENT
Start: 2017-09-07

## 2017-09-08 ENCOUNTER — OFFICE VISIT (OUTPATIENT)
Dept: INTERNAL MEDICINE CLINIC | Age: 82
End: 2017-09-08

## 2017-09-08 VITALS
HEIGHT: 62 IN | OXYGEN SATURATION: 86 % | HEART RATE: 97 BPM | RESPIRATION RATE: 27 BRPM | DIASTOLIC BLOOD PRESSURE: 90 MMHG | SYSTOLIC BLOOD PRESSURE: 136 MMHG

## 2017-09-08 DIAGNOSIS — R09.02 HYPOXIA: ICD-10-CM

## 2017-09-08 DIAGNOSIS — J84.9 INTERSTITIAL LUNG DISEASE (HCC): ICD-10-CM

## 2017-09-08 DIAGNOSIS — R41.0 CONFUSION: Primary | ICD-10-CM

## 2017-09-08 NOTE — PROGRESS NOTES
1. Have you been to the ER, urgent care clinic since your last visit? Hospitalized since your last visit? No    2. Have you seen or consulted any other health care providers outside of the [facility] since your last visit? Include any pap smears or colon screening. No    1 week follow up.

## 2017-09-08 NOTE — PROGRESS NOTES
Subjective:   Junior Edmondson is a 80 y.o. female      Chief Complaint   Patient presents with    Breathing Problem     1 week follow up    Hypertension        History of present illness: She presents in follow-up for restrictive lung disease with acute hypoxemia and confusion. She claims she is feeling better. Her  is with her and is not convinced that she is feeling better. She has only been using the oxygen at nighttime although was prescribed for 24 hours a day. She notes no cough chest congestion other complaints at the present time the no other specific complaints.     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    Hematuria R31.9    Leg weakness, bilateral R29.898    Myalgia M79.1    Rheumatoid arthritis ([address] M06.9    Urinary incontinence in female R32    Mixed hyperlipidemia E78.2    Acquired hypothyroidism E03.9    Gastroesophageal reflux disease without esophagitis K21.9    Medicare annual wellness visit, initial Z00.00    Colon cancer screening Z12.11    Confusion R41.0    Hypoxia R09.02    Essential hypertension I10    Interstitial lung disease ([facility] J84.9      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    Rt lobectomy, treatment, yaerly cxr neg    History of urinary incontinence     on Detrol    Hyperlipidemia LDL goal <100 8/12/2017    Hypertension, benign 8/12/2017    Hypothyroid     Ill-defined condition     Lobectomy 1958 due to TB; has scarring, followed by Dr Bishnu Albrecht Impacted cerumen, left ear 8/12/2017    Leg weakness, bilateral 8/12/2017    Long-term use of high-risk medication 8/12/2017    Multiple contusions 8/12/2017    Myalgia 8/12/2017    Non-dose-related adverse reaction to medication 8/12/2017    Posttraumatic hematoma of left breast 8/12/2017    Rheumatoid arthritis ([address] 8/12/2017    Swollen wrist, left 8/12/2017    Urinary incontinence in female 8/12/2017      Allergies   Allergen Reactions    Penicillins Swelling    Shellfish Derived Swelling    Tramadol Unknown (comments)      Family History   Problem Relation Age of Onset    No Known Problems Mother     Stroke Father       Social History     Social History    Marital status:      Spouse name: N/A    Number of children: N/A    Years of education: N/A     Occupational History    Not on file. Social History Main Topics    Smoking status: Former Smoker    Smokeless tobacco: Never Used    Alcohol use Yes      Comment: 1 martini with dinner every night    Drug use: No    Sexual activity: Not on file     Other Topics Concern    Not on file     Social History Narrative     Prior to Admission medications    Medication Sig Start Date End Date Taking? Authorizing Provider   hydroCHLOROthiazide (HYDRODIURIL) 25 mg tablet TAKE 1 TABLET BY MOUTH EVERY DAY 9/7/17  Yes Kam Perkins MD   predniSONE (DELTASONE) 20 mg tablet Take 2 Tabs by mouth daily (with breakfast). 9/1/17  Yes Kam Perkins MD   budesonide-formoterol [facility]) 160-4.5 mcg/actuation HFA inhaler Take 2 Puffs by inhalation two (2) times a day. Yes Historical Provider   latanoprost (XALATAN) 0.005 % ophthalmic solution Administer 1 drop to both eyes nightly. Yes Historical Provider   omeprazole (PRILOSEC) 20 mg capsule Take 20 mg by mouth daily. Indications: GASTROESOPHAGEAL REFLUX   Yes Historical Provider   levothyroxine (SYNTHROID) 100 mcg tablet Take  by mouth Daily (before breakfast). Indications: HYPOTHYROIDISM   Yes Historical Provider   pravastatin (PRAVACHOL) 40 mg tablet Take 40 mg by mouth nightly. Indications: HYPERCHOLESTEROLEMIA   Yes Historical Provider   tolterodine ER (DETROL LA) 4 mg ER capsule Take 4 mg by mouth daily. Indications: URINARY URGE INCONTINENCE   Yes Historical Provider   loratadine (CLARITIN) 10 mg tablet Take 10 mg by mouth daily. Yes Historical Provider   acetaminophen (TYLENOL ARTHRITIS PAIN) 650 mg CR tablet Take 1,300 mg by mouth two (2) times a day. Yes Historical Provider   aspirin delayed-release 81 mg tablet Take  by mouth daily. Yes Historical Provider   MULTIVITS W-FE,OTHER MIN/LUT (CENTRUM SILVER ULTRA WOMEN'S PO) Take  by mouth daily. Yes Historical Provider        Review of Systems              Constitutional:  She denies fever, weight loss, sweats or fatigue. EYES: No blurred or double vision,               ENT: no nasal congestion, no headache or dizziness. No difficulty with                         swallowing, taste, speech or smell. Respiratory: Cough with mild congestion. No sputum production. Her overall chronic dyspnea may be better according to her  Cardiac:  Denies chest pain, palpitations, unexplained indigestion, syncope, edema, PND or orthopnea. GI:  No changes in bowel movements, no abdominal pain, no bloating, anorexia, nausea, vomiting or heartburn. :  No frequency or dysuria. Denies incontinence or sexual dysfunction. Extremities:  No joint pain, stiffness or swelling  Back:.no pain or soreness  Skin:  No recent rashes or mole changes. Neurological:  No numbness, tingling, burning paresthesias or loss of motor strength. No syncope, dizziness, frequent headaches or memory loss.   Some confusion  Hematologic:  No easy bruising  Lymphatic: No lymph node enlargement    Objective:     Vitals:    09/08/17 1541   BP: 136/90   Pulse: 97   Resp: 27   SpO2: (!) 86%   Height: 5' 1.5\" (1.562 m)   PainSc:   0 - No pain There is no height or weight on file to calculate BMI. Physical Examination:              General Appearance:  Well-developed, well-nourished, no acute distress. HEENT:      Ears:  The TMs and ear canals were clear. Eyes:  The pupillary responses were normal.  Extraocular muscle function intact. Lids and conjunctiva not injected. Funduscopic exam revealed sharp disc margins. Nares: Clear w/o edema or erythema  Pharynx:  Clear with teeth in good repair. No masses were noted. Neck:  Supple without thyromegaly or adenopathy. No JVD noted. No carotid                bruits. Lungs:  Clear to auscultation and percussion with occasional scattered rhonchi and overall decreased breath sounds. Cardiac:  Regular rate and rhythm without murmur. PMI not displaced. No gallop, rub or click. Abdominal: Soft, non-tender, no hepata-spleenomegally or masses  Extremities:  No clubbing, cyanosis or edema. Skin:  No rash or unusual mole changes noted. Lymph Nodes:  None felt in the cervical, supraclavicular, axillary or inguinal region. Neurological: . DTRs 2+ and symmetric. Currently in a wheelchair today alert and oriented ×3 although her  notes that she seems to be confused to him at times  Hematologic:   No purpura or petechiae        Assessment/Plan:         1. Confusion    2. Interstitial lung disease ([address]    3. Hypoxia        Impressions/Plan:  1   Confusion may be related to hypoxemia O2 sat sitting in a wheelchair at rest a day on room air is only 86% so she clearly needs oxygen 24 hours a day. 2.  Interstitial lung disease this is chronic placed on prednisone last week and I will continue same  3. Hypoxemia she clearly is oxygen 24 hours today we called the home oxygen company today to confirm that. Next expressed the importance of that with her and her . No medication changes we will check her back again in 1 month or sooner should they be a problem.     Follow-up Disposition:  Return in about 4 weeks (around 10/6/2017). No results found for any visits on 09/08/17. Kezia Nieves MD    The patient was given after the visit summary the patient verbalized an understanding of the plans and problems as explained.

## 2017-09-08 NOTE — MR AVS SNAPSHOT
Visit Information Date & Time Provider Department Dept. Phone Encounter #  
 9/8/2017  2:30 PM Jeromy Morataya, [facility] [address] [facility] [phone] [id number] Follow-up Instructions Return in about 4 weeks (around 10/6/2017). Your Appointments 10/10/2017  1:20 PM  
FOLLOW UP 10 with MD LEAH Walker [facility] ([address]) Appt Note: 1 mo follow up [address] [phone] [address] [address]  
  
    
 2/27/2018 10:10 AM  
FOLLOW UP 10 with MD LEAH Walker [facility] ([address]) Appt Note: 6 MO FLP; [address] [phone] [phone] Upcoming Health Maintenance Date Due DTaP/Tdap/Td series (1 - Tdap) [birth year]10/1954 ZOSTER VACCINE AGE 60> 1/10/1993 GLAUCOMA SCREENING Q2Y 3/10/1998 OSTEOPOROSIS SCREENING (DEXA) 3/10/1998 Pneumococcal 65+ Low/Medium Risk (2 of 2 - PCV13) 12/4/2016 INFLUENZA AGE 9 TO ADULT 8/1/2017 MEDICARE YEARLY EXAM 8/29/2018 Allergies as of 9/8/2017  Review Complete On: 9/8/2017 By: Jeromy Morataya MD  
  
 Severity Noted Reaction Type Reactions Penicillins  10/01/2014    Swelling Shellfish Derived  10/01/2014    Swelling Tramadol  08/12/2017    Unknown (comments) Current Immunizations  Never Reviewed Name Date Influenza Vaccine 10/1/2015, 9/1/2014 Pneumococcal Conjugate (PCV-13) 12/4/2015 Pneumococcal Polysaccharide (PPSV-23) 12/4/2015 Not reviewed this visit You Were Diagnosed With   
  
 Codes Comments Confusion    -  Primary ICD-10-CM: R41.0 ICD-9-CM: 298.9 Interstitial lung disease ([address]     ICD-10-CM: J84.9 ICD-9-CM: 605 Hypoxia     ICD-10-CM: R09.02 
ICD-9-CM: 799.02 Vitals BP Pulse Resp Height(growth percentile) SpO2 OB Status 136/90 (BP 1 Location: Left arm, BP Patient Position: Sitting) 97 27 5' 1.5\" (1.562 m) (!) 86% Postmenopausal  
 Smoking Status Former Smoker Preferred Pharmacy Pharmacy Name Phone [facility] #[id number] [place] [phone] Your Updated Medication List  
  
   
This list is accurate as of: 9/8/17  4:13 PM.  Always use your most recent med list.  
  
  
  
  
 aspirin delayed-release 81 mg tablet Take  by mouth daily. CENTRUM SILVER ULTRA WOMEN'S PO Take  by mouth daily. cephALEXin 500 mg capsule Commonly known as:  Estel Girma Take 1 Cap by mouth two (2) times a day. CLARITIN 10 mg tablet Generic drug:  loratadine Take 10 mg by mouth daily. DETROL LA 4 mg ER capsule Generic drug:  tolterodine ER Take 4 mg by mouth daily. Indications: URINARY URGE INCONTINENCE  
  
 hydroCHLOROthiazide 25 mg tablet Commonly known as:  HYDRODIURIL  
TAKE 1 TABLET BY MOUTH EVERY DAY  
  
 latanoprost 0.005 % ophthalmic solution Commonly known as:  Jannet Shultz Administer 1 drop to both eyes nightly. levothyroxine 100 mcg tablet Commonly known as:  SYNTHROID Take  by mouth Daily (before breakfast). Indications: HYPOTHYROIDISM  
  
 omeprazole 20 mg capsule Commonly known as:  PRILOSEC Take 20 mg by mouth daily. Indications: GASTROESOPHAGEAL REFLUX  
  
 pravastatin 40 mg tablet Commonly known as:  PRAVACHOL Take 40 mg by mouth nightly. Indications: HYPERCHOLESTEROLEMIA  
  
 predniSONE 20 mg tablet Commonly known as:  Alvie Sarah Ann Take 2 Tabs by mouth daily (with breakfast). SYMBICORT 160-4.5 mcg/actuation HFA inhaler Generic drug:  budesonide-formoterol Take 2 Puffs by inhalation two (2) times a day. TYLENOL ARTHRITIS PAIN 650 mg CR tablet Generic drug:  acetaminophen Take 1,300 mg by mouth two (2) times a day. Follow-up Instructions Return in about 4 weeks (around 10/6/2017). Introducing [facility]! Zhanna Simmonses introduces [facility] patient portal. Now you can access parts of your medical record, email your doctor's office, and request medication refills online. 1. In your internet browser, go to [email or web address] 2. Click on the First Time User? Click Here link in the Sign In box. You will see the New Member Sign Up page. 3. Enter your [facility] Access Code exactly as it appears below. You will not need to use this code after youve completed the sign-up process. If you do not sign up before the expiration date, you must request a new code. · [facility] Access Code: [email or web address] Expires: 10/16/2017 10:05 AM 
 
4. Enter the last four digits of your Social Security Number (xxxx) and Date of Birth (mm/dd/yyyy) as indicated and click Submit. You will be taken to the next sign-up page. 5. Create a [facility] ID. This will be your [facility] login ID and cannot be changed, so think of one that is secure and easy to remember. 6. Create a [facility] password. You can change your password at any time. 7. Enter your Password Reset Question and Answer. This can be used at a later time if you forget your password. 8. Enter your e-mail address. You will receive e-mail notification when new information is available in [address] 9. Click Sign Up. You can now view and download portions of your medical record. 10. Click the Download Summary menu link to download a portable copy of your medical information. If you have questions, please visit the Frequently Asked Questions section of the [facility] website. Remember, [facility] is NOT to be used for urgent needs. For medical emergencies, dial 911. Now available from your iPhone and Android! Please provide this summary of care documentation to your next provider. Your primary care clinician is listed as [place].  If you have any questions after today's visit, please call [phone].

## 2017-09-22 LAB
BACTERIA UR CULT: ABNORMAL

## 2017-09-22 RX ORDER — PRAVASTATIN SODIUM 40 MG/1
TABLET ORAL
Qty: 90 TAB | Refills: 3 | Status: SHIPPED | OUTPATIENT
Start: 2017-09-22

## 2017-09-22 NOTE — TELEPHONE ENCOUNTER
Requested Prescriptions     Pending Prescriptions Disp Refills    pravastatin (PRAVACHOL) 40 mg tablet [Pharmacy Med Name: PRAVASTATIN SODIUM 40 MG TAB] 90 Tab 3     Sig: TAKE 1 TABLET, ORAL, DAILY

## 2017-09-27 ENCOUNTER — OFFICE VISIT (OUTPATIENT)
Dept: INTERNAL MEDICINE CLINIC | Age: 82
End: 2017-09-27

## 2017-09-27 VITALS
SYSTOLIC BLOOD PRESSURE: 132 MMHG | DIASTOLIC BLOOD PRESSURE: 94 MMHG | OXYGEN SATURATION: 83 % | RESPIRATION RATE: 28 BRPM | HEART RATE: 95 BPM

## 2017-09-27 DIAGNOSIS — R29.898 LEG WEAKNESS, BILATERAL: ICD-10-CM

## 2017-09-27 DIAGNOSIS — J84.9 INTERSTITIAL LUNG DISEASE (HCC): Primary | ICD-10-CM

## 2017-09-27 DIAGNOSIS — R41.0 CONFUSION: ICD-10-CM

## 2017-09-27 DIAGNOSIS — R09.02 HYPOXIA: ICD-10-CM

## 2017-09-27 RX ORDER — PREDNISONE 20 MG/1
20 TABLET ORAL DAILY
Qty: 60 TAB | Refills: 1 | Status: SHIPPED | OUTPATIENT
Start: 2017-09-27 | End: 2017-12-18 | Stop reason: SDUPTHER

## 2017-09-27 NOTE — PROGRESS NOTES
Chief Complaint   Patient presents with    Altered mental status     worse at times       SUBJECTIVE:    Michelle Gordon is a 80 y.o. female who presents coming by her  for follow-up regarding her hypoxemia and severe interstitial lung disease. Her  notes she has been increasingly confused. The oxygen company has not delivered a portable oxygen and they are actually having to carry her in the big tank that they have to roll. She comes in today without her oxygen on because the big tank was too much to carry with the wheelchair. She seemed to be confused here in the office and does note that she is having more problems with confusion. She continues taking the prednisone of 40 mg a day. She has some cough but not getting any sputum up. She denies any fevers or chills. There is no other cardiorespiratory complaints noted. And then no other neurologic complaints other than bilateral leg weakness without any focal findings. She is wondering if physical therapy may be beneficial at least the  was more interested in that and she was but she is interested if it may help her. Current Outpatient Prescriptions   Medication Sig Dispense Refill    predniSONE (DELTASONE) 20 mg tablet Take 1 Tab by mouth daily. 60 Tab 1    pravastatin (PRAVACHOL) 40 mg tablet TAKE 1 TABLET, ORAL, DAILY 90 Tab 3    hydroCHLOROthiazide (HYDRODIURIL) 25 mg tablet TAKE 1 TABLET BY MOUTH EVERY DAY 90 Tab 3    budesonide-formoterol (SYMBICORT) 160-4.5 mcg/actuation HFA inhaler Take 2 Puffs by inhalation two (2) times a day.  latanoprost (XALATAN) 0.005 % ophthalmic solution Administer 1 drop to both eyes nightly.  omeprazole (PRILOSEC) 20 mg capsule Take 20 mg by mouth daily. Indications: GASTROESOPHAGEAL REFLUX      levothyroxine (SYNTHROID) 100 mcg tablet Take  by mouth Daily (before breakfast).  Indications: HYPOTHYROIDISM      tolterodine ER (DETROL LA) 4 mg ER capsule Take 4 mg by mouth daily. Indications: URINARY URGE INCONTINENCE      loratadine (CLARITIN) 10 mg tablet Take 10 mg by mouth daily.  acetaminophen (TYLENOL ARTHRITIS PAIN) 650 mg CR tablet Take 1,300 mg by mouth two (2) times a day.  aspirin delayed-release 81 mg tablet Take  by mouth daily.  MULTIVITS W-FE,OTHER MIN/LUT (CENTRUM SILVER ULTRA WOMEN'S PO) Take  by mouth daily.        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    Rt lobectomy, treatment, yaerly cxr neg    History of urinary incontinence     on Detrol    Hyperlipidemia LDL goal <100 8/12/2017    Hypertension, benign 8/12/2017    Hypothyroid     Ill-defined condition     Lobectomy 1958 due to TB; has scarring, followed by Dr Sparkle Pisano Impacted cerumen, left ear 8/12/2017    Leg weakness, bilateral 8/12/2017    Long-term use of high-risk medication 8/12/2017    Multiple contusions 8/12/2017    Myalgia 8/12/2017    Non-dose-related adverse reaction to medication 8/12/2017    Posttraumatic hematoma of left breast 8/12/2017    Rheumatoid arthritis ([address] 8/12/2017    Swollen wrist, left 8/12/2017    Urinary incontinence in female 8/12/2017     Past Surgical History:   Procedure Laterality Date    HX BREAST BIOPSY Right     benign core    HX CATARACT REMOVAL Bilateral 2009    HX LOBECTOMY Right 1958    due to TB     Allergies   Allergen Reactions    Penicillins Swelling    Shellfish Derived Swelling    Tramadol Unknown (comments)       REVIEW OF SYSTEMS:  General: negative for - chills or fever, or weight loss or gain  ENT: negative for - headaches, nasal congestion or tinnitus  Eyes: no blurred or visual changes  Neck: No stiffness or swollen nodes  Respiratory: Positive for cough shortness of breath disc some congestion but not getting up any sputum. Cardiovascular : negative for - chest pain, edema, palpitations or shortness of breath  Gastrointestinal: negative for - abdominal pain, blood in stools, heartburn or nausea/vomiting  Genito-Urinary: no dysuria, trouble voiding, or hematuria  Musculoskeletal: negative for - gait disturbance, joint pain, joint stiffness or joint swelling  Neurological: no TIA or stroke symptoms. Confusion described above. Leg weakness as noted above bilateral  Hematologic: no bruises, no bleeding  Lymphatic: no swollen glands  Integument: no lumps, mole changes, nail changes or rash  Endocrine:no malaise/lethargy poly uria or polydipsia or unexpected weight changes        Social History     Social History    Marital status:      Spouse name: N/A    Number of children: N/A    Years of education: N/A     Social History Main Topics    Smoking status: Former Smoker    Smokeless tobacco: Never Used    Alcohol use Yes      Comment: 1 martini with dinner every night    Drug use: No    Sexual activity: Not Asked     Other Topics Concern    None     Social History Narrative     Family History   Problem Relation Age of Onset    No Known Problems Mother     Stroke Father        OBJECTIVE:     Visit Vitals    BP (!) 132/94 (BP 1 Location: Right arm, BP Patient Position: Sitting)    Pulse 95    Resp 28    SpO2 (!) 83%     CONSTITUTIONAL:   well nourished, appears age appropriate  EYES: sclera anicteric, PERRL, EOMI  ENMT:nars clear, moist mucous membranes, pharynx clear  NECK: supple. Thyroid normal, No JVD or bruits  RESPIRATORY: Chest: Coarse expiratory breath sounds throughout bilateral.  Bibasilar dry crackles.   No wheezing  CARDIOVASCULAR: Heart: regular rate and rhythm no murmurs, rubs or gallops, PMI not displaced, No thrills  GASTROINTESTINAL: Abdomen: non distended, soft, non tender, bowel sounds normal  HEMATOLOGIC: no purpura, petechiae or bruising  LYMPHATIC: No lymph node enlargemant  MUSCULOSKELETAL: Extremities: no edema or active synovitis, pulse 1+. Generally weak sitting in a wheelchair says that she has a hard time transferring because of leg weakness. INTEGUMENT: No unusual rashes or suspicious skin lesions noted. Nails appear normal.  PERIPHERAL VASCULAR: normal pulses femoral, PT and DP  NEUROLOGIC: non-focal exam, A & O X 3 although she is slow to answer questions and does appear to be somewhat confused  PSYCHIATRIC:, appropriate affect     ASSESSMENT:   1. Interstitial lung disease ([address]    2. Hypoxia    3. Confusion    4. Leg weakness, bilateral      Impression  1. Interstitial lung disease seems to be severe oxygen level today 83% sitting at rest in the wheelchair without oxygen. As noted she left her oxygen in the car we will go ahead and try decreasing the prednisone from 40 a day to 20 a day since I am not sure the prednisone is helping. I did tell her and her  that she should wear the oxygen 24 hours a day 7 days a week. We call the oxygen company to see if we could get her a portable tank but spent about 25 minutes on hold with the oxygen company. This occurred while the patient was in the office today. 2.  Hypoxemia as noted  3. Confusion I am sure is related to the above  4. Leg weakness bilateral we will see if home health physical therapy can help mobilize her to help at least with transfers. At this point will decrease to prednisone will see if he can get the oxygen ranged but I will not make any other medication changes and did not check any labs today. I will recheck her again as her previous schedule appointment which is about 2 weeks from now. All the above discussed at length with her  present with her in office today.   Greater than 30 minutes spent on this office visit today of greater than 50% of it spent counseling and coordinating care    PLAN:  .  Orders Placed This Encounter    REFERRAL TO PHYSICAL THERAPY    predniSONE (DELTASONE) 20 mg tablet         ATTENTION:   This medical record was transcribed using an electronic medical records system. Although proofread, it may and can contain electronic and spelling errors. Other human spelling and other errors may be present. Corrections may be executed at a later time. Please feel free to contact us for any clarifications as needed. Follow-up Disposition:  Return in about 2 weeks (around 10/11/2017). No results found for any visits on 09/27/17. Nikko Wilson MD    The patient verbalized understanding of the problems and plans as explained.

## 2017-09-27 NOTE — PROGRESS NOTES
1. Have you been to the ER, urgent care clinic since your last visit? Hospitalized since your last visit? No    2. Have you seen or consulted any other health care providers outside of the [address] [place] [address] since your last visit? Include any pap smears or colon screening. No    Concern regarding increased confusion.

## 2017-09-27 NOTE — MR AVS SNAPSHOT
Visit Information Date & Time Provider Department Dept. Phone Encounter #  
 9/27/2017  2:00 PM Karlonicole VelascoDarius [address] [phone] [id number] Your Appointments 10/10/2017  1:20 PM  
FOLLOW UP 10 with MD LEAH [facility] ([address]) Appt Note: 1 mo follow up [address] [address] [address] [phone] 2/27/2018 10:10 AM  
FOLLOW UP 10 with MD LEAH [facility] ([address]) Appt Note: 6 MO FLP; [address] [phone] Upcoming Health Maintenance Date Due DTaP/Tdap/Td series (1 - Tdap) 3/10/1954 ZOSTER VACCINE AGE 60> 1/10/1993 GLAUCOMA SCREENING Q2Y 3/10/1998 OSTEOPOROSIS SCREENING (DEXA) 3/10/1998 Pneumococcal 65+ Low/Medium Risk (2 of 2 - PCV13) 12/4/2016 INFLUENZA AGE 9 TO ADULT 8/1/2017 MEDICARE YEARLY EXAM 8/29/2018 Allergies as of 9/27/2017  Review Complete On: 9/27/2017 By: Joni Kaiser RN Severity Noted Reaction Type Reactions Penicillins  10/01/2014    Swelling Shellfish Derived  10/01/2014    Swelling Tramadol  08/12/2017    Unknown (comments) Current Immunizations  Never Reviewed Name Date Influenza Vaccine 10/1/2015, 9/1/2014 Pneumococcal Conjugate (PCV-13) 12/4/2015 Pneumococcal Polysaccharide (PPSV-23) 12/4/2015 Not reviewed this visit Vitals BP Pulse Resp SpO2 OB Status Smoking Status (!) 132/94 (BP 1 Location: Right arm, BP Patient Position: Sitting) 95 28 (!) 83% Postmenopausal Former Smoker Preferred Pharmacy Pharmacy Name Phone [phone] [facility] [phone] Your Updated Medication List  
  
   
 This list is accurate as of: 9/27/17  3:17 PM.  Always use your most recent med list.  
  
  
  
  
 aspirin delayed-release 81 mg tablet Take  by mouth daily. CENTRUM SILVER ULTRA WOMEN'S PO Take  by mouth daily. CLARITIN 10 mg tablet Generic drug:  loratadine Take 10 mg by mouth daily. DETROL LA 4 mg ER capsule Generic drug:  tolterodine ER Take 4 mg by mouth daily. Indications: URINARY URGE INCONTINENCE  
  
 hydroCHLOROthiazide 25 mg tablet Commonly known as:  HYDRODIURIL  
TAKE 1 TABLET BY MOUTH EVERY DAY  
  
 latanoprost 0.005 % ophthalmic solution Commonly known as:  Maantonio Edinger Administer 1 drop to both eyes nightly. levothyroxine 100 mcg tablet Commonly known as:  SYNTHROID Take  by mouth Daily (before breakfast). Indications: HYPOTHYROIDISM  
  
 omeprazole 20 mg capsule Commonly known as:  PRILOSEC Take 20 mg by mouth daily. Indications: GASTROESOPHAGEAL REFLUX  
  
 pravastatin 40 mg tablet Commonly known as:  PRAVACHOL  
TAKE 1 TABLET, ORAL, DAILY predniSONE 20 mg tablet Commonly known as:  Juan Micheal Take 1 Tab by mouth daily. SYMBICORT 160-4.5 mcg/actuation Hfaa Generic drug:  budesonide-formoterol Take 2 Puffs by inhalation two (2) times a day. TYLENOL ARTHRITIS PAIN 650 mg CR tablet Generic drug:  acetaminophen Take 1,300 mg by mouth two (2) times a day. Prescriptions Sent to Pharmacy Refills  
 predniSONE (DELTASONE) 20 mg tablet 1 Sig: Take 1 Tab by mouth daily. Class: Normal  
 Pharmacy: [place] [address], [address] [place] [address] #: [phone] Route: Oral  
  
Introducing [facility]! [employer] introduces PROSimity patient portal. Now you can access parts of your medical record, email your doctor's office, and request medication refills online. 1. In your internet browser, go to [email or web address] 2. Click on the First Time User? Click Here link in the Sign In box. You will see the New Member Sign Up page. 3. Enter your HobbyTalk Access Code exactly as it appears below. You will not need to use this code after youve completed the sign-up process. If you do not sign up before the expiration date, you must request a new code. · HobbyTalk Access Code: [email or web address] Expires: 10/16/2017 10:05 AM 
 
4. Enter the last four digits of your Social Security Number (xxxx) and Date of Birth (mm/dd/yyyy) as indicated and click Submit. You will be taken to the next sign-up page. 5. Create a HobbyTalk ID. This will be your HobbyTalk login ID and cannot be changed, so think of one that is secure and easy to remember. 6. Create a HobbyTalk password. You can change your password at any time. 7. Enter your Password Reset Question and Answer. This can be used at a later time if you forget your password. 8. Enter your e-mail address. You will receive e-mail notification when new information is available in [address] 9. Click Sign Up. You can now view and download portions of your medical record. 10. Click the Download Summary menu link to download a portable copy of your medical information. If you have questions, please visit the Frequently Asked Questions section of the HobbyTalk website. Remember, HobbyTalk is NOT to be used for urgent needs. For medical emergencies, dial 911. Now available from your iPhone and Android! Please provide this summary of care documentation to your next provider. Your primary care clinician is listed as Luh. If you have any questions after today's visit, please call [phone].

## 2017-10-10 ENCOUNTER — OFFICE VISIT (OUTPATIENT)
Dept: INTERNAL MEDICINE CLINIC | Age: 82
End: 2017-10-10

## 2017-10-10 VITALS
BODY MASS INDEX: 24.55 KG/M2 | OXYGEN SATURATION: 95 % | RESPIRATION RATE: 18 BRPM | DIASTOLIC BLOOD PRESSURE: 77 MMHG | WEIGHT: 130 LBS | SYSTOLIC BLOOD PRESSURE: 112 MMHG | HEIGHT: 61 IN | HEART RATE: 90 BPM | TEMPERATURE: 97.4 F

## 2017-10-10 DIAGNOSIS — R09.02 HYPOXIA: ICD-10-CM

## 2017-10-10 DIAGNOSIS — I10 ESSENTIAL HYPERTENSION: ICD-10-CM

## 2017-10-10 DIAGNOSIS — R41.0 CONFUSION: ICD-10-CM

## 2017-10-10 DIAGNOSIS — J84.9 INTERSTITIAL LUNG DISEASE (HCC): Primary | ICD-10-CM

## 2017-10-10 RX ORDER — CEPHALEXIN 500 MG/1
CAPSULE ORAL
Refills: 0 | COMMUNITY
Start: 2017-08-28 | End: 2017-10-10 | Stop reason: ALTCHOICE

## 2017-10-10 RX ORDER — DONEPEZIL HYDROCHLORIDE 5 MG/1
5 TABLET, FILM COATED ORAL
Qty: 30 TAB | Refills: 0 | Status: SHIPPED | OUTPATIENT
Start: 2017-10-10 | End: 2017-11-15 | Stop reason: ALTCHOICE

## 2017-10-10 NOTE — PATIENT INSTRUCTIONS
Learning About Delirium  What is delirium? Delirium is a sudden change in mental condition. It leads to confusion and unusual behavior. Delirium is also called acute confusional state. Delirium affects all age groups. It can result from problems that affect the brain, such as stroke. It can also happen after an infection or when using certain medicines. Pain may also cause the problem. Seeing delirium in a loved one can be scary and sad. But it will go away most of the time. It usually lasts hours to days. The doctor will look for a cause and take steps to treat it and keep your loved one comfortable. What are the symptoms? Symptoms of delirium usually develop over several hours to a few days. Symptoms may change and be more or less severe. Symptoms include:  · A short attention span. · Confusion. This is not knowing where you are, what time it is, or who others are. · Hallucinations. This usually is seeing or hearing things that are not really there. · Delusions. This is believing things that aren't true. · Illusions. This is making a mistake in what you think is real. For example, you think a child is crying, but it's a pillow. · Disorganized thinking. How is delirium treated? The doctor may:  · Find and treat the cause. This could be:  ¨ Not getting enough fluids. ¨ An infection. ¨ A medicine or combination of medicines. ¨ Another medical problem. · Prescribe a medicine. · Make the hospital room as quiet as possible. You may be able to help your loved one by being present and talking to and touching him or her. Follow-up care is a key part of your treatment and safety. Be sure to make and go to all appointments, and call your doctor if you are having problems. It's also a good idea to know your test results and keep a list of the medicines you take. Where can you learn more? Go to http://lizzie-ector.info/.   Enter V677 in the search box to learn more about \"Learning About Delirium. \"  Current as of: July 26, 2016  Content Version: 11.3  © 3800-1482 [employer], [facility]. Care instructions adapted under license by [employer] (which disclaims liability or warranty for this information). If you have questions about a medical condition or this instruction, always ask your healthcare professional. [place] [address] any warranty or liability for your use of this information.

## 2017-10-10 NOTE — PROGRESS NOTES
Chief Complaint   Patient presents with    Follow-up     1 Month F/up       SUBJECTIVE:    Anup Murray is a 80 y.o. female who returns in follow-up accompanied by her  with follow-up for interstitial lung disease with hypoxemia, intermittent confusion, and hypertension, her  notes that most days she seems to be clear and she is agreeing to that. There is some days however she is very confused. She feels like her breathing is much better since she is on oxygen. She denies any significant cough chest congestion other cardiorespiratory complaints. She does get markedly short of breath if she does stop the oxygen however. She did try increasing the oxygen from 2-2-1/2 L and actually felt worse of the 2-1/2 L. There are no other complaints on complete review of systems. Current Outpatient Prescriptions   Medication Sig Dispense Refill    FLUZONE HIGH-DOSE 2017-18, PF, syrg injection TO BE ADMINISTERED BY PHARMACIST FOR IMMUNIZATION  0    predniSONE (DELTASONE) 20 mg tablet Take 1 Tab by mouth daily. 60 Tab 1    pravastatin (PRAVACHOL) 40 mg tablet TAKE 1 TABLET, ORAL, DAILY 90 Tab 3    hydroCHLOROthiazide (HYDRODIURIL) 25 mg tablet TAKE 1 TABLET BY MOUTH EVERY DAY 90 Tab 3    budesonide-formoterol (SYMBICORT) 160-4.5 mcg/actuation HFA inhaler Take 2 Puffs by inhalation two (2) times a day.  latanoprost (XALATAN) 0.005 % ophthalmic solution Administer 1 drop to both eyes nightly.  omeprazole (PRILOSEC) 20 mg capsule Take 20 mg by mouth daily. Indications: GASTROESOPHAGEAL REFLUX      levothyroxine (SYNTHROID) 100 mcg tablet Take  by mouth Daily (before breakfast). Indications: HYPOTHYROIDISM      tolterodine ER (DETROL LA) 4 mg ER capsule Take 4 mg by mouth daily. Indications: URINARY URGE INCONTINENCE      loratadine (CLARITIN) 10 mg tablet Take 10 mg by mouth daily.       acetaminophen (TYLENOL ARTHRITIS PAIN) 650 mg CR tablet Take 1,300 mg by mouth two (2) times a day.  aspirin delayed-release 81 mg tablet Take  by mouth daily.        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    Rt lobectomy, treatment, yaerly cxr neg    History of urinary incontinence     on Detrol    Hyperlipidemia LDL goal <100 8/12/2017    Hypertension, benign 8/12/2017    Hypothyroid     Ill-defined condition     Lobectomy 1958 due to TB; has scarring, followed by Dr Rachael Sullivan Impacted cerumen, left ear 8/12/2017    Leg weakness, bilateral 8/12/2017    Long-term use of high-risk medication 8/12/2017    Multiple contusions 8/12/2017    Myalgia 8/12/2017    Non-dose-related adverse reaction to medication 8/12/2017    Posttraumatic hematoma of left breast 8/12/2017    Rheumatoid arthritis ([facility] 8/12/2017    Swollen wrist, left 8/12/2017    Urinary incontinence in female 8/12/2017     Past Surgical History:   Procedure Laterality Date    HX BREAST BIOPSY Right     benign core    HX CATARACT REMOVAL Bilateral 2009    HX LOBECTOMY Right 1958    due to TB     Allergies   Allergen Reactions    Penicillins Swelling    Shellfish Derived Swelling    Tramadol Unknown (comments)       REVIEW OF SYSTEMS:  General: negative for - chills or fever, or weight loss or gain  ENT: negative for - headaches, nasal congestion or tinnitus  Eyes: no blurred or visual changes  Neck: No stiffness or swollen nodes  Respiratory: negative for - cough, hemoptysis, or change of her chronic shortness of breath as long as she wears the oxygen without wheezing  Cardiovascular : negative for - chest pain, edema, palpitations or shortness of breath  Gastrointestinal: negative for - abdominal pain, blood in stools, heartburn or nausea/vomiting  Genito-Urinary: no dysuria, trouble voiding, or hematuria  Musculoskeletal: negative for - gait disturbance, joint pain, joint stiffness or joint swelling  Neurological: no TIA or stroke symptoms. Intermittent confusion  Hematologic: no bruises, no bleeding  Lymphatic: no swollen glands  Integument: no lumps, mole changes, nail changes or rash  Endocrine:no malaise/lethargy poly uria or polydipsia or unexpected weight changes        Social History     Social History    Marital status:      Spouse name: N/A    Number of children: N/A    Years of education: N/A     Social History Main Topics    Smoking status: Former Smoker    Smokeless tobacco: Never Used    Alcohol use Yes      Comment: 1 martini with dinner every night    Drug use: No    Sexual activity: Not Asked     Other Topics Concern    None     Social History Narrative     Family History   Problem Relation Age of Onset    No Known Problems Mother     Stroke Father        OBJECTIVE:     Visit Vitals    /77 (BP 1 Location: Right arm, BP Patient Position: Sitting)    Pulse 90    Temp 97.4 °F (36.3 °C) (Oral)    Resp 18    Ht 5' 1\" (1.549 m)    Wt 130 lb (59 kg)    SpO2 95%    BMI 24.56 kg/m2     CONSTITUTIONAL:   well nourished, appears age appropriate  EYES: sclera anicteric, PERRL, EOMI  ENMT:nars clear, moist mucous membranes, pharynx clear  NECK: supple.  Thyroid normal, No JVD or bruits  RESPIRATORY: Chest: clear to ascultation except dry bibasilar crackles and percussion, normal inspiratory effort  CARDIOVASCULAR: Heart: regular rate and rhythm no murmurs, rubs or gallops, PMI not displaced, No thrills  GASTROINTESTINAL: Abdomen: non distended, soft, non tender, bowel sounds normal  HEMATOLOGIC: no purpura, petechiae or bruising  LYMPHATIC: No lymph node enlargemant  MUSCULOSKELETAL: Extremities: no edema or active synovitis, pulse 1+   INTEGUMENT: No unusual rashes or suspicious skin lesions noted. Nails appear normal.  PERIPHERAL VASCULAR: normal pulses femoral, PT and DP  NEUROLOGIC: non-focal exam, A & O X 3  PSYCHIATRIC:, appropriate affect     ASSESSMENT:   1. Interstitial lung disease ([address]    2. Essential hypertension    3. Hypoxia    4. Confusion      Impression  1. Interstitial lung disease with hypoxemia continue oxygen 2 L  2. Hypertension that is controlled continue current therapy  3. Intermittent confusion that may be some early dementia at this point I did discuss the possibility of Aricept and Namenda she does not want to take an extra pill however. We will not make any medication changes today and I will check her in about 3 months or sooner should be a problem above discussed with her  present with her today. Her  is often called back and said that after discussion they decided to go with some medicine for memory. I will thus start Aricept 5 mg daily and recheck her in a month. PLAN:  .  Orders Placed This Encounter    DISCONTD: cephALEXin (KEFLEX) 500 mg capsule    FLUZONE HIGH-DOSE 2017-18, PF, syrg injection         ATTENTION:   This medical record was transcribed using an electronic medical records system. Although proofread, it may and can contain electronic and spelling errors. Other human spelling and other errors may be present. Corrections may be executed at a later time. Please feel free to contact us for any clarifications as needed. Follow-up Disposition:  Return in about 3 months (around 1/10/2018). No results found for any visits on 10/10/17. Ambreen Honeycutt MD    The patient verbalized understanding of the problems and plans as explained.

## 2017-10-10 NOTE — PROGRESS NOTES
Luisa Alva is a 80 y.o. female      Chief Complaint   Patient presents with    Follow-up     1 Month F/up       1. Have you been to the ER, urgent care clinic since your last visit? Hospitalized since your last visit? No    2. Have you seen or consulted any other health care providers outside of the [address] [place] [address] since your last visit? Include any pap smears or colon screening.  No

## 2017-10-10 NOTE — MR AVS SNAPSHOT
Visit Information Date & Time Provider Department Dept. Phone Encounter #  
 10/10/2017  1:20 PM Lisa Zhou MD [facility] [id number] Follow-up Instructions Return in about 3 months (around 1/10/2018). Follow-up and Disposition History Your Appointments 2/27/2018 10:10 AM  
FOLLOW UP 10 with MD LEAH [facility] ([address]) Appt Note: 6 MO FLP; [address] [address] [address] [facility] [address] [address] Upcoming Health Maintenance Date Due DTaP/Tdap/Td series (1 - Tdap) 3/10/1954 ZOSTER VACCINE AGE 60> 1/10/1993 GLAUCOMA SCREENING Q2Y 3/10/1998 OSTEOPOROSIS SCREENING (DEXA) 3/10/1998 Pneumococcal 65+ Low/Medium Risk (2 of 2 - PCV13) 12/4/2016 MEDICARE YEARLY EXAM 8/29/2018 Allergies as of 10/10/2017  Review Complete On: 10/10/2017 By: Lisa Zhou MD  
  
 Severity Noted Reaction Type Reactions Penicillins  10/01/2014    Swelling Shellfish Derived  10/01/2014    Swelling Tramadol  08/12/2017    Unknown (comments) Current Immunizations  Never Reviewed Name Date Influenza Vaccine 10/1/2015, 9/1/2014 Pneumococcal Conjugate (PCV-13) 12/4/2015 Pneumococcal Polysaccharide (PPSV-23) 12/4/2015 Not reviewed this visit You Were Diagnosed With   
  
 Codes Comments Interstitial lung disease ([facility]    -  Primary ICD-10-CM: J84.9 ICD-9-CM: 896 Essential hypertension     ICD-10-CM: I10 
ICD-9-CM: 401.9 Hypoxia     ICD-10-CM: R09.02 
ICD-9-CM: 799.02 Confusion     ICD-10-CM: R41.0 ICD-9-CM: 298.9 Vitals BP Pulse Temp Resp Height(growth percentile) Weight(growth percentile) 112/77 (BP 1 Location: Right arm, BP Patient Position: Sitting) 90 97.4 °F (36.3 °C) (Oral) 18 5' 1\" (1.549 m) 130 lb (59 kg) SpO2 BMI OB Status Smoking Status 95% 24.56 kg/m2 Postmenopausal Former Smoker Vitals History BMI and BSA Data Body Mass Index Body Surface Area 24.56 kg/m 2 1.59 m 2 Preferred Pharmacy Pharmacy Name Phone [id number] [phone] [facility] [phone] Your Updated Medication List  
  
   
This list is accurate as of: 10/10/17  2:16 PM.  Always use your most recent med list.  
  
  
  
  
 aspirin delayed-release 81 mg tablet Take  by mouth daily. CLARITIN 10 mg tablet Generic drug:  loratadine Take 10 mg by mouth daily. DETROL LA 4 mg ER capsule Generic drug:  tolterodine ER Take 4 mg by mouth daily. Indications: URINARY URGE INCONTINENCE FLUZONE HIGH-DOSE 2017-18 (PF) Syrg injection Generic drug:  influenza vaccine 2017-18 (65 yrs+)(PF)  
TO BE ADMINISTERED BY PHARMACIST FOR IMMUNIZATION  
  
 hydroCHLOROthiazide 25 mg tablet Commonly known as:  HYDRODIURIL  
TAKE 1 TABLET BY MOUTH EVERY DAY  
  
 latanoprost 0.005 % ophthalmic solution Commonly known as:  Conley Nageotte Administer 1 drop to both eyes nightly. levothyroxine 100 mcg tablet Commonly known as:  SYNTHROID Take  by mouth Daily (before breakfast). Indications: HYPOTHYROIDISM  
  
 omeprazole 20 mg capsule Commonly known as:  PRILOSEC Take 20 mg by mouth daily. Indications: GASTROESOPHAGEAL REFLUX  
  
 pravastatin 40 mg tablet Commonly known as:  PRAVACHOL  
TAKE 1 TABLET, ORAL, DAILY predniSONE 20 mg tablet Commonly known as:  Johanna Chill Take 1 Tab by mouth daily. SYMBICORT 160-4.5 mcg/actuation Hfaa Generic drug:  budesonide-formoterol Take 2 Puffs by inhalation two (2) times a day. TYLENOL ARTHRITIS PAIN 650 mg CR tablet Generic drug:  acetaminophen Take 1,300 mg by mouth two (2) times a day. Follow-up Instructions Return in about 3 months (around 1/10/2018). Patient Instructions Learning About Delirium What is delirium? Delirium is a sudden change in mental condition. It leads to confusion and unusual behavior. Delirium is also called acute confusional state. Delirium affects all age groups. It can result from problems that affect the brain, such as stroke. It can also happen after an infection or when using certain medicines. Pain may also cause the problem. Seeing delirium in a loved one can be scary and sad. But it will go away most of the time. It usually lasts hours to days. The doctor will look for a cause and take steps to treat it and keep your loved one comfortable. What are the symptoms? Symptoms of delirium usually develop over several hours to a few days. Symptoms may change and be more or less severe. Symptoms include: · A short attention span. · Confusion. This is not knowing where you are, what time it is, or who others are. · Hallucinations. This usually is seeing or hearing things that are not really there. · Delusions. This is believing things that aren't true. · Illusions. This is making a mistake in what you think is real. For example, you think a child is crying, but it's a pillow. · Disorganized thinking. How is delirium treated? The doctor may: · Find and treat the cause. This could be: ¨ Not getting enough fluids. ¨ An infection. ¨ A medicine or combination of medicines. ¨ Another medical problem. · Prescribe a medicine. · Make the hospital room as quiet as possible. You may be able to help your loved one by being present and talking to and touching him or her. Follow-up care is a key part of your treatment and safety. Be sure to make and go to all appointments, and call your doctor if you are having problems. It's also a good idea to know your test results and keep a list of the medicines you take. Where can you learn more? Go to http://lizzie-ector.info/. Enter X872 in the search box to learn more about \"Learning About Delirium. \" Current as of: July 26, 2016 Content Version: 11.3 © 7835-2871 [employer]. Care instructions adapted under license by [employer] (which disclaims liability or warranty for this information). If you have questions about a medical condition or this instruction, always ask your healthcare professional. [place] [address] any warranty or liability for your use of this information. Patient Instructions History Introducing [facility]! [employer] introduces Opax patient portal. Now you can access parts of your medical record, email your doctor's office, and request medication refills online. 1. In your internet browser, go to [email or web address] 2. Click on the First Time User? Click Here link in the Sign In box. You will see the New Member Sign Up page. 3. Enter your Opax Access Code exactly as it appears below. You will not need to use this code after youve completed the sign-up process. If you do not sign up before the expiration date, you must request a new code. · Opax Access Code: [email or web address] Expires: 10/16/2017 10:05 AM 
 
4. Enter the last four digits of your Social Security Number (xxxx) and Date of Birth (mm/dd/yyyy) as indicated and click Submit. You will be taken to the next sign-up page. 5. Create a Opax ID. This will be your Opax login ID and cannot be changed, so think of one that is secure and easy to remember. 6. Create a Opax password. You can change your password at any time. 7. Enter your Password Reset Question and Answer. This can be used at a later time if you forget your password. 8. Enter your e-mail address. You will receive e-mail notification when new information is available in [facility]. 9. Click Sign Up. You can now view and download portions of your medical record. 10. Click the Download Summary menu link to download a portable copy of your medical information. If you have questions, please visit the Frequently Asked Questions section of the D2S website. Remember, D2S is NOT to be used for urgent needs. For medical emergencies, dial 911. Now available from your iPhone and Android! Please provide this summary of care documentation to your next provider. Your primary care clinician is listed as Luh. If you have any questions after today's visit, please call [phone].

## 2017-10-12 ENCOUNTER — TELEPHONE (OUTPATIENT)
Dept: INTERNAL MEDICINE CLINIC | Age: 82
End: 2017-10-12

## 2017-10-12 NOTE — TELEPHONE ENCOUNTER
called regarding deterioration of the patients mental status. He has noticed a tremendous change since yesterday. Says she is using her oxygen 24 hours a day. Offered patient's  to bring her in but he states he is unable to do that because she is unable. Then advised to call 911 because they could take her to the hospital to rule any infection or run any scans they could be necessary. Advised numerous times to have the patient seen and evaluated ASAP.

## 2017-10-13 RX ORDER — LEVOTHYROXINE SODIUM 100 UG/1
TABLET ORAL
Qty: 90 TAB | Refills: 3 | Status: SHIPPED | OUTPATIENT
Start: 2017-10-13 | End: 2017-11-15 | Stop reason: SDUPTHER

## 2017-10-13 RX ORDER — LEVOTHYROXINE SODIUM 100 UG/1
100 TABLET ORAL
Qty: 30 TAB | Refills: 11 | Status: SHIPPED | OUTPATIENT
Start: 2017-10-13 | End: 2017-11-17 | Stop reason: SDUPTHER

## 2017-10-13 NOTE — TELEPHONE ENCOUNTER
Requested Prescriptions     Pending Prescriptions Disp Refills    levothyroxine (SYNTHROID) 100 mcg tablet [Pharmacy Med Name: LEVOTHYROXINE 100 MCG TABLET] 90 Tab 3     Sig: TAKE 1 TABLET, ORAL, DAILY

## 2017-10-13 NOTE — TELEPHONE ENCOUNTER
Requested Prescriptions     Pending Prescriptions Disp Refills    levothyroxine (SYNTHROID) 100 mcg tablet 30 Tab 11     Sig: Take 1 Tab by mouth Daily (before breakfast).  Indications: hypothyroidism

## 2017-10-24 ENCOUNTER — OFFICE VISIT (OUTPATIENT)
Dept: INTERNAL MEDICINE CLINIC | Age: 82
End: 2017-10-24

## 2017-10-24 VITALS
TEMPERATURE: 97.6 F | WEIGHT: 130 LBS | SYSTOLIC BLOOD PRESSURE: 105 MMHG | HEIGHT: 61 IN | OXYGEN SATURATION: 92 % | HEART RATE: 88 BPM | RESPIRATION RATE: 16 BRPM | BODY MASS INDEX: 24.55 KG/M2 | DIASTOLIC BLOOD PRESSURE: 73 MMHG

## 2017-10-24 DIAGNOSIS — R09.02 HYPOXIA: ICD-10-CM

## 2017-10-24 DIAGNOSIS — J84.9 INTERSTITIAL LUNG DISEASE (HCC): ICD-10-CM

## 2017-10-24 DIAGNOSIS — R41.0 CONFUSION: Primary | ICD-10-CM

## 2017-10-24 RX ORDER — MEMANTINE HYDROCHLORIDE 5 MG/1
5 TABLET ORAL DAILY
Qty: 30 TAB | Refills: 0 | Status: SHIPPED | OUTPATIENT
Start: 2017-10-24 | End: 2017-11-15 | Stop reason: SDUPTHER

## 2017-10-24 NOTE — PROGRESS NOTES
Chief Complaint   Patient presents with    Medication Evaluation       SUBJECTIVE:    Brian Santlilan is a 80 y.o. female who has interstitial lung disease with hypoxemia and recently developed some memory problems for which we recently started her on Aricept. She and her  returned today noting she is having more bad days than good days and he is concerned that the Aricept may be making her worse rather than better. She denies any shortness of breath now that she is wearing the oxygen and denies any respiratory symptoms otherwise. There are no other complaints noted. In particular she notes no GI complaints including no nausea vomiting      Current Outpatient Prescriptions   Medication Sig Dispense Refill    memantine (NAMENDA) 5 mg tablet Take 1 Tab by mouth daily. 30 Tab 0    levothyroxine (SYNTHROID) 100 mcg tablet TAKE 1 TABLET, ORAL, DAILY 90 Tab 3    levothyroxine (SYNTHROID) 100 mcg tablet Take 1 Tab by mouth Daily (before breakfast). Indications: hypothyroidism 30 Tab 11    FLUZONE HIGH-DOSE 2017-18, PF, syrg injection TO BE ADMINISTERED BY PHARMACIST FOR IMMUNIZATION  0    donepezil (ARICEPT) 5 mg tablet Take 1 Tab by mouth nightly. 30 Tab 0    predniSONE (DELTASONE) 20 mg tablet Take 1 Tab by mouth daily. 60 Tab 1    pravastatin (PRAVACHOL) 40 mg tablet TAKE 1 TABLET, ORAL, DAILY 90 Tab 3    hydroCHLOROthiazide (HYDRODIURIL) 25 mg tablet TAKE 1 TABLET BY MOUTH EVERY DAY 90 Tab 3    budesonide-formoterol (SYMBICORT) 160-4.5 mcg/actuation HFA inhaler Take 2 Puffs by inhalation two (2) times a day.  latanoprost (XALATAN) 0.005 % ophthalmic solution Administer 1 drop to both eyes nightly.  omeprazole (PRILOSEC) 20 mg capsule Take 20 mg by mouth daily. Indications: GASTROESOPHAGEAL REFLUX      tolterodine ER (DETROL LA) 4 mg ER capsule Take 4 mg by mouth daily. Indications: URINARY URGE INCONTINENCE      loratadine (CLARITIN) 10 mg tablet Take 10 mg by mouth daily.  acetaminophen (TYLENOL ARTHRITIS PAIN) 650 mg CR tablet Take 1,300 mg by mouth two (2) times a day.  aspirin delayed-release 81 mg tablet Take  by mouth daily.        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    Rt lobectomy, treatment, yaerly cxr neg    History of urinary incontinence     on Detrol    Hyperlipidemia LDL goal <100 8/12/2017    Hypertension, benign 8/12/2017    Hypothyroid     Ill-defined condition     Lobectomy 1958 due to TB; has scarring, followed by Dr Brewer Holes Impacted cerumen, left ear 8/12/2017    Leg weakness, bilateral 8/12/2017    Long-term use of high-risk medication 8/12/2017    Multiple contusions 8/12/2017    Myalgia 8/12/2017    Non-dose-related adverse reaction to medication 8/12/2017    Posttraumatic hematoma of left breast 8/12/2017    Rheumatoid arthritis ([facility] [address] 8/12/2017    Swollen wrist, left 8/12/2017    Urinary incontinence in female 8/12/2017     Past Surgical History:   Procedure Laterality Date    HX BREAST BIOPSY Right     benign core    HX CATARACT REMOVAL Bilateral 2009    HX LOBECTOMY Right 1958    due to TB     Allergies   Allergen Reactions    Penicillins Swelling    Shellfish Derived Swelling    Tramadol Unknown (comments)       REVIEW OF SYSTEMS:  General: negative for - chills or fever, or weight loss or gain  ENT: negative for - headaches, nasal congestion or tinnitus  Eyes: no blurred or visual changes  Neck: No stiffness or swollen nodes  Respiratory: negative for - cough, hemoptysis, shortness of breath or wheezing  Cardiovascular : negative for - chest pain, edema, palpitations or shortness of breath  Gastrointestinal: negative for - abdominal pain, blood in stools, heartburn or nausea/vomiting  Genito-Urinary: no dysuria, trouble voiding, or hematuria  Musculoskeletal: negative for - gait disturbance, joint pain, joint stiffness or joint swelling  Neurological: no TIA or stroke symptoms. Decreasing memory  Hematologic: no bruises, no bleeding  Lymphatic: no swollen glands  Integument: no lumps, mole changes, nail changes or rash  Endocrine:no malaise/lethargy poly uria or polydipsia or unexpected weight changes        Social History     Social History    Marital status:      Spouse name: N/A    Number of children: N/A    Years of education: N/A     Social History Main Topics    Smoking status: Former Smoker    Smokeless tobacco: Never Used    Alcohol use Yes      Comment: 1 martini with dinner every night    Drug use: No    Sexual activity: Not Asked     Other Topics Concern    None     Social History Narrative     Family History   Problem Relation Age of Onset    No Known Problems Mother     Stroke Father        OBJECTIVE:     Visit Vitals    /73 (BP 1 Location: Left arm, BP Patient Position: Sitting)    Pulse 88    Temp 97.6 °F (36.4 °C) (Oral)    Resp 16    Ht 5' 1\" (1.549 m)    Wt 130 lb (59 kg)    LMP  (Exact Date)    SpO2 92%    BMI 24.56 kg/m2     CONSTITUTIONAL:   well nourished, appears age appropriate  EYES: sclera anicteric, PERRL, EOMI  ENMT:nars clear, moist mucous membranes, pharynx clear  NECK: supple.  Thyroid normal, No JVD or bruits  RESPIRATORY: Chest: clear to ascultation and percussion, normal inspiratory effort  CARDIOVASCULAR: Heart: regular rate and rhythm no murmurs, rubs or gallops, PMI not displaced, No thrills  GASTROINTESTINAL: Abdomen: non distended, soft, non tender, bowel sounds normal  HEMATOLOGIC: no purpura, petechiae or bruising  LYMPHATIC: No lymph node enlargemant  MUSCULOSKELETAL: Extremities: no edema or active synovitis, pulse 1+   INTEGUMENT: No unusual rashes or suspicious skin lesions noted. Nails appear normal.  PERIPHERAL VASCULAR: normal pulses femoral, PT and DP  NEUROLOGIC: non-focal exam, A & O X 3 although her conversation seems confused  PSYCHIATRIC:, appropriate affect     ASSESSMENT:   1. Confusion    2. Interstitial lung disease ([address]    3. Hypoxia      Impression  1. Confusion O2 sat is 92% to 95% here in the office today so I do not think that is playing a role in her memory she is on oxygen  2. Interstitial lung disease that seems stable  3. Hypoxemia as noted I am going to check a BMP I am not sure that the Aricept is causing a problem but since is a question we will stop it now replaced with Namenda at 5 mg once daily and I will recheck her at her prior appointment on November 15 at which time she is doing better we will increase to Namenda dose. PLAN:  .  Orders Placed This Encounter    AMB POC BASIC METABOLIC PANEL    memantine (NAMENDA) 5 mg tablet         ATTENTION:   This medical record was transcribed using an electronic medical records system. Although proofread, it may and can contain electronic and spelling errors. Other human spelling and other errors may be present. Corrections may be executed at a later time. Please feel free to contact us for any clarifications as needed. Follow-up Disposition:  Return in about 4 weeks (around 11/21/2017). No results found for any visits on 10/24/17. Ambreen Honeycutt MD    The patient verbalized understanding of the problems and plans as explained.

## 2017-10-24 NOTE — MR AVS SNAPSHOT
Visit Information Date & Time Provider Department Dept. Phone Encounter #  
 10/24/2017  2:30 PM Eloise Nicholas, [facility] [phone] [id number] Follow-up Instructions Return in about 4 weeks (around 11/21/2017). Your Appointments 11/15/2017  1:20 PM  
FOLLOW UP 10 with MD MICHELLE Velasquez [facility] ([facility]) Appt Note: 1 month follow up [address] [phone] [address] [address]  
  
    
 2/27/2018 10:10 AM  
FOLLOW UP 10 with MD MICHELLE Velasquez [facility] ([facility]) Appt Note: 6 MO FLP; [address] [address] [phone] Upcoming Health Maintenance Date Due DTaP/Tdap/Td series (1 - Tdap) 3/10/1954 ZOSTER VACCINE AGE 60> 1/10/1993 GLAUCOMA SCREENING Q2Y 3/10/1998 OSTEOPOROSIS SCREENING (DEXA) 3/10/1998 Pneumococcal 65+ Low/Medium Risk (2 of 2 - PCV13) 12/4/2016 MEDICARE YEARLY EXAM 8/29/2018 Allergies as of 10/24/2017  Review Complete On: 10/24/2017 By: Eloise Nicholas MD  
  
 Severity Noted Reaction Type Reactions Penicillins  10/01/2014    Swelling Shellfish Derived  10/01/2014    Swelling Tramadol  08/12/2017    Unknown (comments) Current Immunizations  Never Reviewed Name Date Influenza Vaccine 10/1/2015, 9/1/2014 Pneumococcal Conjugate (PCV-13) 12/4/2015 Pneumococcal Polysaccharide (PPSV-23) 12/4/2015 Not reviewed this visit You Were Diagnosed With   
  
 Codes Comments Confusion    -  Primary ICD-10-CM: R41.0 ICD-9-CM: 298.9 Interstitial lung disease ([facility] [address]     ICD-10-CM: J84.9 ICD-9-CM: 375 Hypoxia     ICD-10-CM: R09.02 
ICD-9-CM: 799.02 Vitals BP Pulse Temp Resp Height(growth percentile) Weight(growth percentile) 105/73 (BP 1 Location: Left arm, BP Patient Position: Sitting) 88 97.6 °F (36.4 °C) (Oral) 16 5' 1\" (1.549 m) 130 lb (59 kg) LMP SpO2 BMI OB Status Smoking Status (Exact Date) 92% 24.56 kg/m2 Postmenopausal Former Smoker Vitals History BMI and BSA Data Body Mass Index Body Surface Area 24.56 kg/m 2 1.59 m 2 Preferred Pharmacy Pharmacy Name Phone [id number] [facility] [phone] Your Updated Medication List  
  
   
This list is accurate as of: 10/24/17  4:17 PM.  Always use your most recent med list.  
  
  
  
  
 aspirin delayed-release 81 mg tablet Take  by mouth daily. CLARITIN 10 mg tablet Generic drug:  loratadine Take 10 mg by mouth daily. DETROL LA 4 mg ER capsule Generic drug:  tolterodine ER Take 4 mg by mouth daily. Indications: URINARY URGE INCONTINENCE  
  
 donepezil 5 mg tablet Commonly known as:  ARICEPT Take 1 Tab by mouth nightly. FLUZONE HIGH-DOSE 2017-18 (PF) Syrg injection Generic drug:  influenza vaccine 2017-18 (65 yrs+)(PF)  
TO BE ADMINISTERED BY PHARMACIST FOR IMMUNIZATION  
  
 hydroCHLOROthiazide 25 mg tablet Commonly known as:  HYDRODIURIL  
TAKE 1 TABLET BY MOUTH EVERY DAY  
  
 latanoprost 0.005 % ophthalmic solution Commonly known as:  Lopez Pry Administer 1 drop to both eyes nightly. * levothyroxine 100 mcg tablet Commonly known as:  SYNTHROID  
TAKE 1 TABLET, ORAL, DAILY * levothyroxine 100 mcg tablet Commonly known as:  SYNTHROID Take 1 Tab by mouth Daily (before breakfast). Indications: hypothyroidism  
  
 memantine 5 mg tablet Commonly known as:  Dorothyann Dus Take 1 Tab by mouth daily. omeprazole 20 mg capsule Commonly known as:  PRILOSEC Take 20 mg by mouth daily. Indications: GASTROESOPHAGEAL REFLUX  
  
 pravastatin 40 mg tablet Commonly known as:  PRAVACHOL  
TAKE 1 TABLET, ORAL, DAILY predniSONE 20 mg tablet Commonly known as:  Galo Sandra Take 1 Tab by mouth daily. SYMBICORT 160-4.5 mcg/actuation Hfaa Generic drug:  budesonide-formoterol Take 2 Puffs by inhalation two (2) times a day. TYLENOL ARTHRITIS PAIN 650 mg CR tablet Generic drug:  acetaminophen Take 1,300 mg by mouth two (2) times a day. * Notice: This list has 2 medication(s) that are the same as other medications prescribed for you. Read the directions carefully, and ask your doctor or other care provider to review them with you. Prescriptions Sent to Pharmacy Refills  
 memantine (NAMENDA) 5 mg tablet 0 Sig: Take 1 Tab by mouth daily. Class: Normal  
 Pharmacy: [facility] [address] [address] [place] [address] #: [phone] Route: Oral  
  
We Performed the Following AMB POC BASIC METABOLIC PANEL [30305 CPT(R)] Follow-up Instructions Return in about 4 weeks (around 11/21/2017). Introducing [facility] & HEALTH SERVICES! Mayank Costello introduces [employer] patient portal. Now you can access parts of your medical record, email your doctor's office, and request medication refills online. 1. In your internet browser, go to [email or web address] 2. Click on the First Time User? Click Here link in the Sign In box. You will see the New Member Sign Up page. 3. Enter your [employer] Access Code exactly as it appears below. You will not need to use this code after youve completed the sign-up process. If you do not sign up before the expiration date, you must request a new code. · [employer] Access Code: [email or web address] Expires: 1/22/2018  2:47 PM 
 
4. Enter the last four digits of your Social Security Number (xxxx) and Date of Birth (mm/dd/yyyy) as indicated and click Submit. You will be taken to the next sign-up page. 5. Create a [employer] ID.  This will be your [employer] login ID and cannot be changed, so think of one that is secure and easy to remember. 6. Create a Un[employer] password. You can change your password at any time. 7. Enter your Password Reset Question and Answer. This can be used at a later time if you forget your password. 8. Enter your e-mail address. You will receive e-mail notification when new information is available in [address] 9. Click Sign Up. You can now view and download portions of your medical record. 10. Click the Download Summary menu link to download a portable copy of your medical information. If you have questions, please visit the Frequently Asked Questions section of the [employer] website. Remember, [employer] is NOT to be used for urgent needs. For medical emergencies, dial 911. Now available from your iPhone and Android! Please provide this summary of care documentation to your next provider. Your primary care clinician is listed as Luh. If you have any questions after today's visit, please call [phone].

## 2017-10-24 NOTE — PROGRESS NOTES
Mga Ward EnriqueHernandez is a 80 y.o. female      Chief Complaint   Patient presents with    Medication Evaluation         1. Have you been to the ER, urgent care clinic since your last visit? Hospitalized since your last visit? No    2. Have you seen or consulted any other health care providers outside of the [address] [place] [address] since your last visit? Include any pap smears or colon screening.  No

## 2017-10-25 LAB
BUN BLD-MCNC: 30 MG/DL (ref 7–17)
CALCIUM BLD-MCNC: 9.9 MG/DL (ref 8.4–10.2)
CHLORIDE BLD-SCNC: 100 MMOL/L (ref 98–107)
CO2 POC: 31 MMOL/L (ref 22–32)
CREAT BLD-MCNC: 0.6 MG/DL (ref 0.7–1.2)
EGFR (POC): 83.7
GLUCOSE POC: 137 MG/DL (ref 65–105)
POTASSIUM SERPL-SCNC: 4.5 MMOL/L (ref 3.6–5)
SODIUM SERPL-SCNC: 143 MMOL/L (ref 137–145)

## 2017-11-15 ENCOUNTER — OFFICE VISIT (OUTPATIENT)
Dept: INTERNAL MEDICINE CLINIC | Age: 82
End: 2017-11-15

## 2017-11-15 VITALS
DIASTOLIC BLOOD PRESSURE: 70 MMHG | SYSTOLIC BLOOD PRESSURE: 106 MMHG | HEART RATE: 87 BPM | OXYGEN SATURATION: 95 % | HEIGHT: 61 IN | RESPIRATION RATE: 16 BRPM | TEMPERATURE: 98 F

## 2017-11-15 DIAGNOSIS — J84.9 INTERSTITIAL LUNG DISEASE (HCC): ICD-10-CM

## 2017-11-15 DIAGNOSIS — G30.1 LATE ONSET ALZHEIMER'S DISEASE WITHOUT BEHAVIORAL DISTURBANCE (HCC): Primary | ICD-10-CM

## 2017-11-15 DIAGNOSIS — F02.80 LATE ONSET ALZHEIMER'S DISEASE WITHOUT BEHAVIORAL DISTURBANCE (HCC): Primary | ICD-10-CM

## 2017-11-15 DIAGNOSIS — I10 ESSENTIAL HYPERTENSION: ICD-10-CM

## 2017-11-15 RX ORDER — MEMANTINE HYDROCHLORIDE 5 MG/1
5 TABLET ORAL 2 TIMES DAILY
Qty: 60 TAB | Refills: 0 | Status: SHIPPED | OUTPATIENT
Start: 2017-11-15 | End: 2017-12-18 | Stop reason: SDUPTHER

## 2017-11-15 NOTE — MR AVS SNAPSHOT
Visit Information Date & Time Provider Department Dept. Phone Encounter #  
 11/15/2017  1:20 PM Mitch Petersen MD [facility] [id number] Follow-up Instructions Return if symptoms worsen or fail to improve. Your Appointments 2/27/2018 10:10 AM  
FOLLOW UP 10 with MD LEAH Esparza [facility] ([address]) Appt Note: 6 MO FLP; [address] [address] [address] [facility] [address] Upcoming Health Maintenance Date Due DTaP/Tdap/Td series (1 - Tdap) 3/10/1954 ZOSTER VACCINE AGE 60> 1/10/1993 GLAUCOMA SCREENING Q2Y 3/10/1998 OSTEOPOROSIS SCREENING (DEXA) 3/10/1998 Pneumococcal 65+ Low/Medium Risk (2 of 2 - PCV13) 12/4/2016 MEDICARE YEARLY EXAM 8/29/2018 Allergies as of 11/15/2017  Review Complete On: 11/15/2017 By: Mitch Petersen MD  
  
 Severity Noted Reaction Type Reactions Penicillins  10/01/2014    Swelling Shellfish Derived  10/01/2014    Swelling Tramadol  08/12/2017    Unknown (comments) Current Immunizations  Never Reviewed Name Date Influenza Vaccine 10/1/2015, 9/1/2014 Pneumococcal Conjugate (PCV-13) 12/4/2015 Pneumococcal Polysaccharide (PPSV-23) 12/4/2015 Not reviewed this visit You Were Diagnosed With   
  
 Codes Comments Late onset Alzheimer's disease without behavioral disturbance    -  Primary ICD-10-CM: G30.1, F02.80 ICD-9-CM: 331.0, 294.10 Essential hypertension     ICD-10-CM: I10 
ICD-9-CM: 401.9 Interstitial lung disease ([facility] [address]     ICD-10-CM: J84.9 ICD-9-CM: 215 Vitals BP Pulse Temp Resp Height(growth percentile) LMP  
 106/70 (BP 1 Location: Left arm, BP Patient Position: Sitting) 87 98 °F (36.7 °C) (Oral) 16 5' 1\" (1.549 m) (Exact Date) SpO2 OB Status Smoking Status 95% Postmenopausal Former Smoker Vitals History Preferred Pharmacy Pharmacy Name Phone [id number] [id number] [facility] [phone] Your Updated Medication List  
  
   
This list is accurate as of: 11/15/17  1:48 PM.  Always use your most recent med list.  
  
  
  
  
 aspirin delayed-release 81 mg tablet Take  by mouth daily. CLARITIN 10 mg tablet Generic drug:  loratadine Take 10 mg by mouth daily. DETROL LA 4 mg ER capsule Generic drug:  tolterodine ER Take 4 mg by mouth daily. Indications: URINARY URGE INCONTINENCE  
  
 hydroCHLOROthiazide 25 mg tablet Commonly known as:  HYDRODIURIL  
TAKE 1 TABLET BY MOUTH EVERY DAY  
  
 latanoprost 0.005 % ophthalmic solution Commonly known as:  Pooja Tone Administer 1 drop to both eyes nightly. levothyroxine 100 mcg tablet Commonly known as:  SYNTHROID Take 1 Tab by mouth Daily (before breakfast). Indications: hypothyroidism  
  
 memantine 5 mg tablet Commonly known as:  Ly Parviz Take 1 Tab by mouth two (2) times a day. omeprazole 20 mg capsule Commonly known as:  PRILOSEC Take 20 mg by mouth daily. Indications: GASTROESOPHAGEAL REFLUX  
  
 pravastatin 40 mg tablet Commonly known as:  PRAVACHOL  
TAKE 1 TABLET, ORAL, DAILY predniSONE 20 mg tablet Commonly known as:  Reino Ledbetter Take 1 Tab by mouth daily. SYMBICORT 160-4.5 mcg/actuation Hfaa Generic drug:  budesonide-formoterol Take 2 Puffs by inhalation two (2) times a day. TYLENOL ARTHRITIS PAIN 650 mg Catherene Reach Generic drug:  acetaminophen Take 1,300 mg by mouth two (2) times a day. Prescriptions Sent to Pharmacy Refills  
 memantine (NAMENDA) 5 mg tablet 0 Sig: Take 1 Tab by mouth two (2) times a day.   
 Class: Normal  
 Pharmacy: [facility]/pharmacy #[id number], [address] [place] [address] Mark GALEANA AT [facility] #: [phone] Route: Oral  
  
Follow-up Instructions Return if symptoms worsen or fail to improve. Introducing [facility] & HEALTH SERVICES! [facility] introduces [employer] patient portal. Now you can access parts of your medical record, email your doctor's office, and request medication refills online. 1. In your internet browser, go to [email or web address] 2. Click on the First Time User? Click Here link in the Sign In box. You will see the New Member Sign Up page. 3. Enter your [employer] Access Code exactly as it appears below. You will not need to use this code after youve completed the sign-up process. If you do not sign up before the expiration date, you must request a new code. · [employer] Access Code: [email or web address] Expires: 1/22/2018  1:47 PM 
 
4. Enter the last four digits of your Social Security Number (xxxx) and Date of Birth (mm/dd/yyyy) as indicated and click Submit. You will be taken to the next sign-up page. 5. Create a [employer] ID. This will be your [employer] login ID and cannot be changed, so think of one that is secure and easy to remember. 6. Create a [employer] password. You can change your password at any time. 7. Enter your Password Reset Question and Answer. This can be used at a later time if you forget your password. 8. Enter your e-mail address. You will receive e-mail notification when new information is available in [address] 9. Click Sign Up. You can now view and download portions of your medical record. 10. Click the Download Summary menu link to download a portable copy of your medical information. If you have questions, please visit the Frequently Asked Questions section of the [employer] website. Remember, [employer] is NOT to be used for urgent needs. For medical emergencies, dial 911. Now available from your iPhone and Android! Please provide this summary of care documentation to your next provider. Your primary care clinician is listed as Luh. If you have any questions after today's visit, please call [phone].

## 2017-11-15 NOTE — PROGRESS NOTES
1. Have you been to the ER, urgent care clinic since your last visit? Hospitalized since your last visit? No    2. Have you seen or consulted any other health care providers outside of the [address] [place] [address] since your last visit? Include any pap smears or colon screening. No    Chief Complaint   Patient presents with    Altered mental status     Follow up on Namenda     Not fasting    Patient states last eye exam was done in Feb 2017 with Dr. Ruben Lowry.

## 2017-11-15 NOTE — PROGRESS NOTES
Chief Complaint   Patient presents with    Altered mental status     Follow up on Namenda       SUBJECTIVE:    Satish Rosales is a 80 y.o. female who returns in follow-up for her hypertension, interstitial lung disease with hypoxemia, and Alzheimer's dementia. She has noted some benefit with the Namenda and her  is in agreement with that. She is still taking 5 mg once a day. She has no nausea vomiting no GI complaints. Her memory of anything may be a little bit better certainly not any worse. Breathing certainly better with the oxygen and then no other cardiovascular complaints. There are no other complaints on complete review of systems other than generalized weakness which is not changed. Current Outpatient Prescriptions   Medication Sig Dispense Refill    memantine (NAMENDA) 5 mg tablet Take 1 Tab by mouth two (2) times a day. 60 Tab 0    levothyroxine (SYNTHROID) 100 mcg tablet Take 1 Tab by mouth Daily (before breakfast). Indications: hypothyroidism 30 Tab 11    predniSONE (DELTASONE) 20 mg tablet Take 1 Tab by mouth daily. 60 Tab 1    pravastatin (PRAVACHOL) 40 mg tablet TAKE 1 TABLET, ORAL, DAILY 90 Tab 3    hydroCHLOROthiazide (HYDRODIURIL) 25 mg tablet TAKE 1 TABLET BY MOUTH EVERY DAY 90 Tab 3    budesonide-formoterol (SYMBICORT) 160-4.5 mcg/actuation HFA inhaler Take 2 Puffs by inhalation two (2) times a day.  latanoprost (XALATAN) 0.005 % ophthalmic solution Administer 1 drop to both eyes nightly.  omeprazole (PRILOSEC) 20 mg capsule Take 20 mg by mouth daily. Indications: GASTROESOPHAGEAL REFLUX      tolterodine ER (DETROL LA) 4 mg ER capsule Take 4 mg by mouth daily. Indications: URINARY URGE INCONTINENCE      loratadine (CLARITIN) 10 mg tablet Take 10 mg by mouth daily.  acetaminophen (TYLENOL ARTHRITIS PAIN) 650 mg CR tablet Take 1,300 mg by mouth two (2) times a day.  aspirin delayed-release 81 mg tablet Take  by mouth daily.        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    Rt lobectomy, treatment, yaerly cxr neg    History of urinary incontinence     on Detrol    Hyperlipidemia LDL goal <100 8/12/2017    Hypertension, benign 8/12/2017    Hypothyroid     Ill-defined condition     Lobectomy 1958 due to TB; has scarring, followed by Dr Luciana Britton Impacted cerumen, left ear 8/12/2017    Leg weakness, bilateral 8/12/2017    Long-term use of high-risk medication 8/12/2017    Multiple contusions 8/12/2017    Myalgia 8/12/2017    Non-dose-related adverse reaction to medication 8/12/2017    Posttraumatic hematoma of left breast 8/12/2017    Rheumatoid arthritis ([facility] [address] 8/12/2017    Swollen wrist, left 8/12/2017    Urinary incontinence in female 8/12/2017     Past Surgical History:   Procedure Laterality Date    HX BREAST BIOPSY Right     benign core    HX CATARACT REMOVAL Bilateral 2009    HX LOBECTOMY Right 1958    due to TB     Allergies   Allergen Reactions    Penicillins Swelling    Shellfish Derived Swelling    Tramadol Unknown (comments)       REVIEW OF SYSTEMS:  General: negative for - chills or fever, or weight loss or gain  ENT: negative for - headaches, nasal congestion or tinnitus  Eyes: no blurred or visual changes  Neck: No stiffness or swollen nodes  Respiratory: negative for - cough, hemoptysis, shortness of breath or wheezing but dyspnea on exertion without change although improved with oxygen  Cardiovascular : negative for - chest pain, edema, palpitations or shortness of breath  Gastrointestinal: negative for - abdominal pain, blood in stools, heartburn or nausea/vomiting  Genito-Urinary: no dysuria, trouble voiding, or hematuria  Musculoskeletal: negative for - gait disturbance, joint pain, joint stiffness or joint swelling  Neurological: no TIA or stroke symptoms. Generalized nonfocal weakness. Memory decreased without change  Hematologic: no bruises, no bleeding  Lymphatic: no swollen glands  Integument: no lumps, mole changes, nail changes or rash  Endocrine:no malaise/lethargy poly uria or polydipsia or unexpected weight changes        Social History     Social History    Marital status:      Spouse name: N/A    Number of children: N/A    Years of education: N/A     Social History Main Topics    Smoking status: Former Smoker    Smokeless tobacco: Never Used    Alcohol use Yes      Comment: 1 martini with dinner every night    Drug use: No    Sexual activity: Not Asked     Other Topics Concern    None     Social History Narrative     Family History   Problem Relation Age of Onset    No Known Problems Mother     Stroke Father        OBJECTIVE:     Visit Vitals    /70 (BP 1 Location: Left arm, BP Patient Position: Sitting)    Pulse 87    Temp 98 °F (36.7 °C) (Oral)    Resp 16    Ht 5' 1\" (1.549 m)    LMP  (Exact Date)    SpO2 95%  Comment: on 2 liters Oxygen     CONSTITUTIONAL:   well nourished, appears age appropriate  EYES: sclera anicteric, PERRL, EOMI  ENMT:nars clear, moist mucous membranes, pharynx clear  NECK: supple.  Thyroid normal, No JVD or bruits  RESPIRATORY: Chest: clear to ascultation and percussion, normal inspiratory effort  CARDIOVASCULAR: Heart: regular rate and rhythm no murmurs, rubs or gallops, PMI not displaced, No thrills  GASTROINTESTINAL: Abdomen: non distended, soft, non tender, bowel sounds normal  HEMATOLOGIC: no purpura, petechiae or bruising  LYMPHATIC: No lymph node enlargemant  MUSCULOSKELETAL: Extremities: no edema or active synovitis, pulse 1+   INTEGUMENT: No unusual rashes or suspicious skin lesions noted. Nails appear normal.  PERIPHERAL VASCULAR: normal pulses femoral, PT and DP  NEUROLOGIC: non-focal exam, A & O X 3  PSYCHIATRIC:, appropriate affect     ASSESSMENT:   1. Late onset Alzheimer's disease without behavioral disturbance    2. Essential hypertension    3. Interstitial lung disease (HCC)      Impression  1. Alzheimer's dementia will increase Namenda to 5 twice daily and since they will moved moving to [place] will have to get follow-up there to gradually titrate her up to 10 mg twice daily. She previously was placed on Aricept which she did not tolerate. 2.  Hypertension that is controlled continue current treatment  3. Interstitial lung disease on supplemental oxygen continue same  Follow-up will not be scheduled here since then moving to [place] the first week of December. I will see her if she is back in the area needs care prior to that. PLAN:  .  Orders Placed This Encounter    memantine (NAMENDA) 5 mg tablet         ATTENTION:   This medical record was transcribed using an electronic medical records system. Although proofread, it may and can contain electronic and spelling errors. Other human spelling and other errors may be present. Corrections may be executed at a later time. Please feel free to contact us for any clarifications as needed. Follow-up Disposition:  Return if symptoms worsen or fail to improve. No results found for any visits on 11/15/17. Daniel Jaimes MD    The patient verbalized understanding of the problems and plans as explained.

## 2017-11-17 DIAGNOSIS — E03.9 ACQUIRED HYPOTHYROIDISM: Primary | ICD-10-CM

## 2017-11-17 RX ORDER — LEVOTHYROXINE SODIUM 100 UG/1
100 TABLET ORAL
Qty: 90 TAB | Refills: 0 | Status: SHIPPED | OUTPATIENT
Start: 2017-11-17

## 2017-11-17 NOTE — TELEPHONE ENCOUNTER
Requested Prescriptions     Pending Prescriptions Disp Refills    levothyroxine (SYNTHROID) 100 mcg tablet 90 Tab 0     Sig: Take 1 Tab by mouth Daily (before breakfast).  Indications: hypothyroidism

## 2017-11-25 DIAGNOSIS — J42 CHRONIC BRONCHITIS, UNSPECIFIED CHRONIC BRONCHITIS TYPE (HCC): Primary | ICD-10-CM

## 2017-11-27 RX ORDER — BUDESONIDE AND FORMOTEROL FUMARATE DIHYDRATE 160; 4.5 UG/1; UG/1
AEROSOL RESPIRATORY (INHALATION)
Qty: 10.2 INHALER | Refills: 3 | Status: SHIPPED | OUTPATIENT
Start: 2017-11-27 | End: 2017-12-18 | Stop reason: SDUPTHER

## 2017-11-27 NOTE — TELEPHONE ENCOUNTER
Requested Prescriptions     Pending Prescriptions Disp Refills    SYMBICORT 160-4.5 mcg/actuation HFAA [Pharmacy Med Name: SYMBICORT 160-4.5 MCG INHALER] 10.2 Inhaler 3     Sig: TAKE 2 (TWO) PUFF TWO TIMES DAILY (RINSE MOUTH AFTER USE)

## 2017-12-18 DIAGNOSIS — J42 CHRONIC BRONCHITIS, UNSPECIFIED CHRONIC BRONCHITIS TYPE (HCC): ICD-10-CM

## 2017-12-18 RX ORDER — PREDNISONE 20 MG/1
20 TABLET ORAL DAILY
Qty: 30 TAB | Refills: 0 | Status: SHIPPED | OUTPATIENT
Start: 2017-12-18

## 2017-12-18 RX ORDER — BUDESONIDE AND FORMOTEROL FUMARATE DIHYDRATE 160; 4.5 UG/1; UG/1
2 AEROSOL RESPIRATORY (INHALATION) 2 TIMES DAILY
Qty: 10.2 INHALER | Refills: 3 | Status: SHIPPED | OUTPATIENT
Start: 2017-12-18

## 2017-12-18 RX ORDER — MEMANTINE HYDROCHLORIDE 5 MG/1
5 TABLET ORAL 2 TIMES DAILY
Qty: 60 TAB | Refills: 0 | Status: SHIPPED | OUTPATIENT
Start: 2017-12-18